# Patient Record
Sex: FEMALE | Race: BLACK OR AFRICAN AMERICAN | NOT HISPANIC OR LATINO | Employment: FULL TIME | URBAN - METROPOLITAN AREA
[De-identification: names, ages, dates, MRNs, and addresses within clinical notes are randomized per-mention and may not be internally consistent; named-entity substitution may affect disease eponyms.]

---

## 2023-01-26 PROBLEM — Z86.16 HISTORY OF COVID-19: Status: ACTIVE | Noted: 2023-01-26

## 2023-01-26 PROBLEM — N39.0 ACUTE UTI: Status: ACTIVE | Noted: 2023-01-26

## 2023-01-26 PROBLEM — J01.90 ACUTE SINUSITIS: Status: ACTIVE | Noted: 2023-01-26

## 2023-01-26 PROBLEM — R35.0 INCREASED URINARY FREQUENCY: Status: ACTIVE | Noted: 2023-01-26

## 2023-01-26 PROBLEM — M25.562 ACUTE PAIN OF LEFT KNEE: Status: ACTIVE | Noted: 2023-01-26

## 2023-01-26 PROBLEM — Q84.9: Status: ACTIVE | Noted: 2023-01-26

## 2023-01-26 PROBLEM — R51.9 HEADACHE: Status: ACTIVE | Noted: 2023-01-26

## 2023-01-26 PROBLEM — R23.2 HOT FLASHES: Status: ACTIVE | Noted: 2023-01-26

## 2023-01-26 PROBLEM — R05.9 COUGH IN ADULT: Status: ACTIVE | Noted: 2023-01-26

## 2023-01-26 PROBLEM — E66.9 CLASS 1 OBESITY WITH BODY MASS INDEX (BMI) OF 34.0 TO 34.9 IN ADULT: Status: ACTIVE | Noted: 2023-01-26

## 2023-01-26 PROBLEM — E66.811 CLASS 1 OBESITY WITH BODY MASS INDEX (BMI) OF 34.0 TO 34.9 IN ADULT: Status: ACTIVE | Noted: 2023-01-26

## 2023-01-26 PROBLEM — J30.9 ALLERGIC RHINITIS: Status: ACTIVE | Noted: 2023-01-26

## 2023-01-26 PROBLEM — R09.81 NASAL CONGESTION: Status: ACTIVE | Noted: 2023-01-26

## 2023-01-26 PROBLEM — B37.31 VAGINAL CANDIDIASIS: Status: ACTIVE | Noted: 2023-01-26

## 2023-01-26 PROBLEM — K21.9 GERD (GASTROESOPHAGEAL REFLUX DISEASE): Status: ACTIVE | Noted: 2023-01-26

## 2023-01-26 PROBLEM — R60.0 EDEMA OF LOWER EXTREMITY: Status: ACTIVE | Noted: 2023-01-26

## 2023-01-26 PROBLEM — E53.8 VITAMIN B12 DEFICIENCY: Status: ACTIVE | Noted: 2023-01-26

## 2023-01-26 PROBLEM — Z20.822 SUSPECTED COVID-19 VIRUS INFECTION: Status: ACTIVE | Noted: 2023-01-26

## 2023-01-26 PROBLEM — D62 ANEMIA DUE TO ACUTE BLOOD LOSS: Status: ACTIVE | Noted: 2023-01-26

## 2023-01-26 PROBLEM — E55.9 VITAMIN D DEFICIENCY: Status: ACTIVE | Noted: 2023-01-26

## 2023-01-26 PROBLEM — R39.9 UTI SYMPTOMS: Status: ACTIVE | Noted: 2023-01-26

## 2023-01-26 PROBLEM — R07.9 CHEST PAIN: Status: ACTIVE | Noted: 2023-01-26

## 2023-03-10 ENCOUNTER — APPOINTMENT (OUTPATIENT)
Dept: PRIMARY CARE | Facility: CLINIC | Age: 43
End: 2023-03-10
Payer: COMMERCIAL

## 2023-04-06 PROBLEM — M25.562 ACUTE PAIN OF LEFT KNEE: Status: RESOLVED | Noted: 2023-01-26 | Resolved: 2023-04-06

## 2023-04-06 PROBLEM — R39.9 UTI SYMPTOMS: Status: RESOLVED | Noted: 2023-01-26 | Resolved: 2023-04-06

## 2023-04-06 PROBLEM — B37.31 VAGINAL CANDIDIASIS: Status: RESOLVED | Noted: 2023-01-26 | Resolved: 2023-04-06

## 2023-04-06 PROBLEM — R51.9 HEADACHE: Status: RESOLVED | Noted: 2023-01-26 | Resolved: 2023-04-06

## 2023-04-06 PROBLEM — Z00.00 WELL ADULT HEALTH CHECK: Status: ACTIVE | Noted: 2023-04-06

## 2023-04-06 PROBLEM — Z86.16 HISTORY OF COVID-19: Status: RESOLVED | Noted: 2023-01-26 | Resolved: 2023-04-06

## 2023-04-06 PROBLEM — R05.9 COUGH IN ADULT: Status: RESOLVED | Noted: 2023-01-26 | Resolved: 2023-04-06

## 2023-04-06 PROBLEM — R09.81 NASAL CONGESTION: Status: RESOLVED | Noted: 2023-01-26 | Resolved: 2023-04-06

## 2023-04-06 PROBLEM — R35.0 INCREASED URINARY FREQUENCY: Status: RESOLVED | Noted: 2023-01-26 | Resolved: 2023-04-06

## 2023-04-06 PROBLEM — N39.0 ACUTE UTI: Status: RESOLVED | Noted: 2023-01-26 | Resolved: 2023-04-06

## 2023-04-06 PROBLEM — R07.9 CHEST PAIN: Status: RESOLVED | Noted: 2023-01-26 | Resolved: 2023-04-06

## 2023-04-06 PROBLEM — D62 ANEMIA DUE TO ACUTE BLOOD LOSS: Status: RESOLVED | Noted: 2023-01-26 | Resolved: 2023-04-06

## 2023-04-06 PROBLEM — J01.90 ACUTE SINUSITIS: Status: RESOLVED | Noted: 2023-01-26 | Resolved: 2023-04-06

## 2023-04-06 PROBLEM — Z20.822 SUSPECTED COVID-19 VIRUS INFECTION: Status: RESOLVED | Noted: 2023-01-26 | Resolved: 2023-04-06

## 2023-04-07 LAB
ALANINE AMINOTRANSFERASE (SGPT) (U/L) IN SER/PLAS: 14 U/L (ref 7–45)
ALBUMIN (G/DL) IN SER/PLAS: 4 G/DL (ref 3.4–5)
ALKALINE PHOSPHATASE (U/L) IN SER/PLAS: 55 U/L (ref 33–110)
ANION GAP IN SER/PLAS: 9 MMOL/L (ref 10–20)
ASPARTATE AMINOTRANSFERASE (SGOT) (U/L) IN SER/PLAS: 14 U/L (ref 9–39)
BASOPHILS (10*3/UL) IN BLOOD BY AUTOMATED COUNT: 0.02 X10E9/L (ref 0–0.1)
BASOPHILS/100 LEUKOCYTES IN BLOOD BY AUTOMATED COUNT: 0.4 % (ref 0–2)
BILIRUBIN TOTAL (MG/DL) IN SER/PLAS: 0.4 MG/DL (ref 0–1.2)
CALCIDIOL (25 OH VITAMIN D3) (NG/ML) IN SER/PLAS: 18 NG/ML
CALCIUM (MG/DL) IN SER/PLAS: 8.7 MG/DL (ref 8.6–10.3)
CARBON DIOXIDE, TOTAL (MMOL/L) IN SER/PLAS: 29 MMOL/L (ref 21–32)
CHLORIDE (MMOL/L) IN SER/PLAS: 103 MMOL/L (ref 98–107)
CHOLESTEROL (MG/DL) IN SER/PLAS: 118 MG/DL (ref 0–199)
CHOLESTEROL IN HDL (MG/DL) IN SER/PLAS: 42.1 MG/DL
CHOLESTEROL/HDL RATIO: 2.8
COBALAMIN (VITAMIN B12) (PG/ML) IN SER/PLAS: 433 PG/ML (ref 211–911)
CREATININE (MG/DL) IN SER/PLAS: 0.91 MG/DL (ref 0.5–1.05)
EOSINOPHILS (10*3/UL) IN BLOOD BY AUTOMATED COUNT: 0.08 X10E9/L (ref 0–0.7)
EOSINOPHILS/100 LEUKOCYTES IN BLOOD BY AUTOMATED COUNT: 1.7 % (ref 0–6)
ERYTHROCYTE DISTRIBUTION WIDTH (RATIO) BY AUTOMATED COUNT: 12.9 % (ref 11.5–14.5)
ERYTHROCYTE MEAN CORPUSCULAR HEMOGLOBIN CONCENTRATION (G/DL) BY AUTOMATED: 31.3 G/DL (ref 32–36)
ERYTHROCYTE MEAN CORPUSCULAR VOLUME (FL) BY AUTOMATED COUNT: 84 FL (ref 80–100)
ERYTHROCYTES (10*6/UL) IN BLOOD BY AUTOMATED COUNT: 5.25 X10E12/L (ref 4–5.2)
GFR FEMALE: 81 ML/MIN/1.73M2
GLUCOSE (MG/DL) IN SER/PLAS: 86 MG/DL (ref 74–99)
HEMATOCRIT (%) IN BLOOD BY AUTOMATED COUNT: 44.1 % (ref 36–46)
HEMOGLOBIN (G/DL) IN BLOOD: 13.8 G/DL (ref 12–16)
IMMATURE GRANULOCYTES/100 LEUKOCYTES IN BLOOD BY AUTOMATED COUNT: 0 % (ref 0–0.9)
LDL: 68 MG/DL (ref 0–99)
LEUKOCYTES (10*3/UL) IN BLOOD BY AUTOMATED COUNT: 4.8 X10E9/L (ref 4.4–11.3)
LYMPHOCYTES (10*3/UL) IN BLOOD BY AUTOMATED COUNT: 1.47 X10E9/L (ref 1.2–4.8)
LYMPHOCYTES/100 LEUKOCYTES IN BLOOD BY AUTOMATED COUNT: 30.9 % (ref 13–44)
MONOCYTES (10*3/UL) IN BLOOD BY AUTOMATED COUNT: 0.36 X10E9/L (ref 0.1–1)
MONOCYTES/100 LEUKOCYTES IN BLOOD BY AUTOMATED COUNT: 7.6 % (ref 2–10)
NEUTROPHILS (10*3/UL) IN BLOOD BY AUTOMATED COUNT: 2.83 X10E9/L (ref 1.2–7.7)
NEUTROPHILS/100 LEUKOCYTES IN BLOOD BY AUTOMATED COUNT: 59.4 % (ref 40–80)
NRBC (PER 100 WBCS) BY AUTOMATED COUNT: 0 /100 WBC (ref 0–0)
PLATELETS (10*3/UL) IN BLOOD AUTOMATED COUNT: 201 X10E9/L (ref 150–450)
POTASSIUM (MMOL/L) IN SER/PLAS: 4 MMOL/L (ref 3.5–5.3)
PROTEIN TOTAL: 7.3 G/DL (ref 6.4–8.2)
SODIUM (MMOL/L) IN SER/PLAS: 137 MMOL/L (ref 136–145)
TRIGLYCERIDE (MG/DL) IN SER/PLAS: 41 MG/DL (ref 0–149)
UREA NITROGEN (MG/DL) IN SER/PLAS: 14 MG/DL (ref 6–23)
VLDL: 8 MG/DL (ref 0–40)

## 2023-04-10 ENCOUNTER — TELEPHONE (OUTPATIENT)
Dept: PRIMARY CARE | Facility: CLINIC | Age: 43
End: 2023-04-10
Payer: COMMERCIAL

## 2023-04-10 NOTE — TELEPHONE ENCOUNTER
----- Message from Bibi Phillips MD sent at 4/10/2023  9:37 AM EDT -----  Regarding: FW:labs  Please let her know her labs are OK except for her vitamin D. It is too low and she needs to start on at least 2000 IU per day. Thank you.  ----- Message -----  From: [facility] - Lab Results In  Sent: 4/7/2023   9:37 PM EDT  To: Bibi Phillips MD

## 2023-04-11 ENCOUNTER — OFFICE VISIT (OUTPATIENT)
Dept: PRIMARY CARE | Facility: CLINIC | Age: 43
End: 2023-04-11
Payer: COMMERCIAL

## 2023-04-11 VITALS
TEMPERATURE: 98.1 F | OXYGEN SATURATION: 98 % | WEIGHT: 241 LBS | DIASTOLIC BLOOD PRESSURE: 92 MMHG | RESPIRATION RATE: 18 BRPM | HEART RATE: 68 BPM | HEIGHT: 72 IN | SYSTOLIC BLOOD PRESSURE: 138 MMHG | BODY MASS INDEX: 32.64 KG/M2

## 2023-04-11 DIAGNOSIS — E55.9 VITAMIN D DEFICIENCY: ICD-10-CM

## 2023-04-11 DIAGNOSIS — E66.09 CLASS 1 OBESITY DUE TO EXCESS CALORIES WITHOUT SERIOUS COMORBIDITY WITH BODY MASS INDEX (BMI) OF 34.0 TO 34.9 IN ADULT: ICD-10-CM

## 2023-04-11 DIAGNOSIS — R41.89 COGNITIVE CHANGE: ICD-10-CM

## 2023-04-11 DIAGNOSIS — N95.2 ATROPHIC VAGINITIS: ICD-10-CM

## 2023-04-11 DIAGNOSIS — Z00.00 WELL ADULT HEALTH CHECK: Primary | ICD-10-CM

## 2023-04-11 PROCEDURE — 99214 OFFICE O/P EST MOD 30 MIN: CPT | Performed by: FAMILY MEDICINE

## 2023-04-11 PROCEDURE — 3008F BODY MASS INDEX DOCD: CPT | Performed by: FAMILY MEDICINE

## 2023-04-11 PROCEDURE — 1036F TOBACCO NON-USER: CPT | Performed by: FAMILY MEDICINE

## 2023-04-11 RX ORDER — ESTRADIOL 0.1 MG/G
CREAM VAGINAL
Qty: 42.5 G | Refills: 5 | Status: SHIPPED | OUTPATIENT
Start: 2023-04-11 | End: 2023-04-13 | Stop reason: SDUPTHER

## 2023-04-11 NOTE — PROGRESS NOTES
"Subjective   Vance Gage is a 42 y.o. female who presents for Follow-up (6 month follow up for labs ).    Doing well.      Notices lately she has word finding problems.  No other cognitive problems.    Sleep is good .  No snoring.    Had a hysterectomy in 2019 for fibroids.  Notices vaginal dryness with sex.           Review of Systems   Constitutional:  Negative for fever.   Respiratory:  Negative for shortness of breath.    Cardiovascular:  Negative for chest pain.   Gastrointestinal:  Negative for nausea and vomiting.   Neurological:  Negative for dizziness and light-headedness.   All other systems reviewed and are negative.      Objective   BP (!) 138/92   Pulse 68   Temp 36.7 °C (98.1 °F)   Resp 18   Ht 1.816 m (5' 11.5\")   Wt 109 kg (241 lb)   SpO2 98%   BMI 33.14 kg/m²     Physical Exam  HENT:      Head: Normocephalic and atraumatic.      Mouth/Throat:      Mouth: Mucous membranes are moist.      Pharynx: Oropharynx is clear.   Eyes:      Extraocular Movements: Extraocular movements intact.      Pupils: Pupils are equal, round, and reactive to light.   Cardiovascular:      Rate and Rhythm: Normal rate and regular rhythm.      Heart sounds: Normal heart sounds.   Pulmonary:      Effort: Pulmonary effort is normal.      Breath sounds: Normal breath sounds.   Musculoskeletal:         General: Normal range of motion.      Cervical back: Normal range of motion.   Lymphadenopathy:      Cervical: No cervical adenopathy.   Skin:     General: Skin is warm and dry.   Neurological:      General: No focal deficit present.      Mental Status: She is alert.   Psychiatric:         Mood and Affect: Mood normal.         Assessment/Plan   Problem List Items Addressed This Visit          Genitourinary    Atrophic vaginitis    Relevant Medications    estradiol (Estrace) 0.01 % (0.1 mg/gram) vaginal cream       Endocrine/Metabolic    Class 1 obesity with body mass index (BMI) of 34.0 to 34.9 in adult    Vitamin D " deficiency     Start Vitamin D3 5000 international units  daily              Other    Well adult health check - Primary    Cognitive change     Do mind stimulating activities.    Sleep 8 hours per night.    Monitor.  Notify me if things are getting worse.                There are no Patient Instructions on file for this visit.

## 2023-04-12 ENCOUNTER — TELEPHONE (OUTPATIENT)
Dept: PRIMARY CARE | Facility: CLINIC | Age: 43
End: 2023-04-12
Payer: COMMERCIAL

## 2023-04-12 NOTE — TELEPHONE ENCOUNTER
Pharmacy called because they need to know how much estradiol cream she is applying to her vagina. whether it be a pea size or 1 GM. Please advise.

## 2023-04-13 DIAGNOSIS — N95.2 ATROPHIC VAGINITIS: ICD-10-CM

## 2023-04-13 PROBLEM — R41.89 COGNITIVE CHANGE: Status: ACTIVE | Noted: 2023-04-13

## 2023-04-13 RX ORDER — ESTRADIOL 0.1 MG/G
CREAM VAGINAL
Qty: 42.5 G | Refills: 5 | Status: SHIPPED | OUTPATIENT
Start: 2023-04-13 | End: 2023-10-23 | Stop reason: ALTCHOICE

## 2023-04-13 ASSESSMENT — ENCOUNTER SYMPTOMS
FEVER: 0
DIZZINESS: 0
LIGHT-HEADEDNESS: 0
SHORTNESS OF BREATH: 0
VOMITING: 0
NAUSEA: 0

## 2023-04-13 NOTE — ASSESSMENT & PLAN NOTE
Do mind stimulating activities.    Sleep 8 hours per night.    Monitor.  Notify me if things are getting worse.

## 2023-08-15 ENCOUNTER — OFFICE VISIT (OUTPATIENT)
Dept: PRIMARY CARE | Facility: CLINIC | Age: 43
End: 2023-08-15
Payer: COMMERCIAL

## 2023-08-15 VITALS
WEIGHT: 240 LBS | HEART RATE: 76 BPM | RESPIRATION RATE: 18 BRPM | DIASTOLIC BLOOD PRESSURE: 82 MMHG | SYSTOLIC BLOOD PRESSURE: 124 MMHG | BODY MASS INDEX: 33.01 KG/M2 | TEMPERATURE: 98.1 F | OXYGEN SATURATION: 98 %

## 2023-08-15 DIAGNOSIS — R35.0 URINARY FREQUENCY: Primary | ICD-10-CM

## 2023-08-15 DIAGNOSIS — L98.9 SORE ON TOE: ICD-10-CM

## 2023-08-15 PROBLEM — L97.509 SORE ON TOE: Status: ACTIVE | Noted: 2023-08-15

## 2023-08-15 LAB
POC APPEARANCE, URINE: CLEAR
POC BILIRUBIN, URINE: NEGATIVE
POC BLOOD, URINE: ABNORMAL
POC COLOR, URINE: YELLOW
POC GLUCOSE, URINE: NEGATIVE MG/DL
POC KETONES, URINE: NEGATIVE MG/DL
POC LEUKOCYTES, URINE: ABNORMAL
POC NITRITE,URINE: NEGATIVE
POC PH, URINE: 6.5 PH
POC PROTEIN, URINE: ABNORMAL MG/DL
POC SPECIFIC GRAVITY, URINE: 1.02
POC UROBILINOGEN, URINE: 0.2 EU/DL

## 2023-08-15 PROCEDURE — 81002 URINALYSIS NONAUTO W/O SCOPE: CPT | Performed by: NURSE PRACTITIONER

## 2023-08-15 PROCEDURE — 87086 URINE CULTURE/COLONY COUNT: CPT

## 2023-08-15 PROCEDURE — 3008F BODY MASS INDEX DOCD: CPT | Performed by: NURSE PRACTITIONER

## 2023-08-15 PROCEDURE — 1036F TOBACCO NON-USER: CPT | Performed by: NURSE PRACTITIONER

## 2023-08-15 PROCEDURE — 99213 OFFICE O/P EST LOW 20 MIN: CPT | Performed by: NURSE PRACTITIONER

## 2023-08-15 RX ORDER — NITROFURANTOIN 25; 75 MG/1; MG/1
100 CAPSULE ORAL 2 TIMES DAILY
Qty: 10 CAPSULE | Refills: 0 | Status: SHIPPED | OUTPATIENT
Start: 2023-08-15 | End: 2023-08-20

## 2023-08-15 ASSESSMENT — ENCOUNTER SYMPTOMS
SLEEP DISTURBANCE: 0
CHILLS: 0
APPETITE CHANGE: 0
FATIGUE: 0
HEADACHES: 0
FLANK PAIN: 0
COUGH: 0
DIARRHEA: 0
NAUSEA: 0
SHORTNESS OF BREATH: 0
VOMITING: 0
CONSTIPATION: 0
FREQUENCY: 1
DYSURIA: 0
ACTIVITY CHANGE: 0
FEVER: 0

## 2023-08-15 NOTE — PROGRESS NOTES
Subjective   Patient ID: Vance Gage is a 42 y.o. female who presents for UTI and Toe Pain.    UTI- Yesterday   noticed pelvic pressure  Increased hydration  Today having urgency/frequency  No fever or chills  No back pain  Mild diarrhea this morning    L small toe- last Thursday  Stubbed it going into house  Toe is still swollen and painful  Was bruised over the last couple days  Has not tried anything for pain          UTI   This is a new problem. The current episode started yesterday. The problem occurs every urination. The problem has been unchanged. Associated symptoms include frequency and urgency. Pertinent negatives include no chills, flank pain, nausea or vomiting. Associated symptoms comments: Abdominal pain. She has tried nothing for the symptoms. Her past medical history is significant for recurrent UTIs.   Toe Pain   The incident occurred more than 1 week ago. The incident occurred at home. The injury mechanism was a direct blow. The pain is present in the left foot (5 digit). She reports no foreign bodies present. The symptoms are aggravated by movement. She has tried nothing for the symptoms.        Review of Systems   Constitutional:  Negative for activity change, appetite change, chills, fatigue and fever.   HENT:  Negative for congestion.    Respiratory:  Negative for cough and shortness of breath.    Gastrointestinal:  Negative for constipation, diarrhea, nausea and vomiting.   Genitourinary:  Positive for frequency, pelvic pain and urgency. Negative for dysuria, flank pain, vaginal discharge and vaginal pain.   Neurological:  Negative for headaches.   Psychiatric/Behavioral:  Negative for sleep disturbance.        Objective   There were no vitals taken for this visit.    Physical Exam  Vitals reviewed.   Constitutional:       Appearance: Normal appearance.   HENT:      Head: Normocephalic.   Eyes:      Extraocular Movements: Extraocular movements intact.      Pupils: Pupils are equal, round,  and reactive to light.   Cardiovascular:      Rate and Rhythm: Normal rate and regular rhythm.      Pulses: Normal pulses.      Heart sounds: Normal heart sounds.   Pulmonary:      Effort: Pulmonary effort is normal.      Breath sounds: Normal breath sounds.   Abdominal:      General: Abdomen is flat. Bowel sounds are normal.      Palpations: Abdomen is soft.   Musculoskeletal:      Cervical back: Normal range of motion.      Right foot: Normal.      Left foot: Normal range of motion. Swelling and bony tenderness present. No laceration. Normal pulse.   Skin:     General: Skin is warm and dry.      Capillary Refill: Capillary refill takes less than 2 seconds.   Neurological:      General: No focal deficit present.      Mental Status: She is alert and oriented to person, place, and time.   Psychiatric:         Mood and Affect: Mood normal.         Behavior: Behavior normal.         Assessment/Plan   Problem List Items Addressed This Visit       Urinary frequency - Primary     IO UA indicates UTI; Will treat with antibiotics  Take full course until completed  Urine culture to be sent; Will follow up as needed  Hydration encouraged  Follow up with PCP if not improving  ER for any fevers, back pain or worsening of symptoms           Relevant Medications    nitrofurantoin, macrocrystal-monohydrate, (Macrobid) 100 mg capsule    Other Relevant Orders    POCT UA (nonautomated) manually resulted (Completed)    Urine Culture    Sore on toe     Order for xray placed; Will follow up on results  Can take up to 600 mg of ibuprofen for the pain  Encouraged heat/ice for 20 minutes several times a day  Wear supportive shoes while healing  Referral made to ortho if needed  Follow up with PCP as needed           Relevant Orders    Referral to Orthopaedic Surgery    XR foot left 3+ views

## 2023-08-15 NOTE — ASSESSMENT & PLAN NOTE
IO UA indicates UTI; Will treat with antibiotics  Take full course until completed  Urine culture to be sent; Will follow up as needed  Hydration encouraged  Follow up with PCP if not improving  ER for any fevers, back pain or worsening of symptoms

## 2023-08-15 NOTE — ASSESSMENT & PLAN NOTE
Order for xray placed; Will follow up on results  Can take up to 600 mg of ibuprofen for the pain  Encouraged heat/ice for 20 minutes several times a day  Wear supportive shoes while healing  Referral made to ortho if needed  Follow up with PCP as needed

## 2023-08-16 LAB — URINE CULTURE: NORMAL

## 2023-09-27 ENCOUNTER — OFFICE VISIT (OUTPATIENT)
Dept: PRIMARY CARE | Facility: CLINIC | Age: 43
End: 2023-09-27
Payer: COMMERCIAL

## 2023-09-27 VITALS
WEIGHT: 244 LBS | TEMPERATURE: 98.7 F | BODY MASS INDEX: 33.56 KG/M2 | RESPIRATION RATE: 17 BRPM | SYSTOLIC BLOOD PRESSURE: 142 MMHG | DIASTOLIC BLOOD PRESSURE: 90 MMHG | HEART RATE: 76 BPM | OXYGEN SATURATION: 99 %

## 2023-09-27 DIAGNOSIS — R42 DIZZINESS: Primary | ICD-10-CM

## 2023-09-27 DIAGNOSIS — I10 HYPERTENSION, UNSPECIFIED TYPE: ICD-10-CM

## 2023-09-27 PROBLEM — R51.9 HEADACHE: Status: RESOLVED | Noted: 2023-09-27 | Resolved: 2023-09-27

## 2023-09-27 PROBLEM — R07.9 CHEST PAIN: Status: RESOLVED | Noted: 2023-09-27 | Resolved: 2023-09-27

## 2023-09-27 PROBLEM — S90.122A CONTUSION OF LESSER TOE OF LEFT FOOT WITHOUT DAMAGE TO NAIL: Status: RESOLVED | Noted: 2023-09-27 | Resolved: 2023-09-27

## 2023-09-27 PROBLEM — R05.9 COUGH IN ADULT: Status: RESOLVED | Noted: 2023-09-27 | Resolved: 2023-09-27

## 2023-09-27 PROBLEM — J01.90 ACUTE SINUSITIS: Status: RESOLVED | Noted: 2023-09-27 | Resolved: 2023-09-27

## 2023-09-27 PROBLEM — R39.9 UTI SYMPTOMS: Status: RESOLVED | Noted: 2023-01-26 | Resolved: 2023-09-27

## 2023-09-27 PROBLEM — Z86.16 HISTORY OF COVID-19: Status: RESOLVED | Noted: 2023-01-26 | Resolved: 2023-09-27

## 2023-09-27 PROBLEM — R09.81 NASAL CONGESTION: Status: RESOLVED | Noted: 2023-01-26 | Resolved: 2023-09-27

## 2023-09-27 PROBLEM — N39.0 ACUTE UTI: Status: RESOLVED | Noted: 2023-09-27 | Resolved: 2023-09-27

## 2023-09-27 PROBLEM — D62 ANEMIA DUE TO ACUTE BLOOD LOSS: Status: RESOLVED | Noted: 2023-09-27 | Resolved: 2023-09-27

## 2023-09-27 PROBLEM — M25.562 ACUTE PAIN OF LEFT KNEE: Status: RESOLVED | Noted: 2023-09-27 | Resolved: 2023-09-27

## 2023-09-27 PROBLEM — Z20.822 SUSPECTED COVID-19 VIRUS INFECTION: Status: RESOLVED | Noted: 2023-01-26 | Resolved: 2023-09-27

## 2023-09-27 PROCEDURE — 3008F BODY MASS INDEX DOCD: CPT

## 2023-09-27 PROCEDURE — 3080F DIAST BP >= 90 MM HG: CPT

## 2023-09-27 PROCEDURE — 1036F TOBACCO NON-USER: CPT

## 2023-09-27 PROCEDURE — 3077F SYST BP >= 140 MM HG: CPT

## 2023-09-27 PROCEDURE — 99213 OFFICE O/P EST LOW 20 MIN: CPT

## 2023-09-27 RX ORDER — LISINOPRIL 10 MG/1
10 TABLET ORAL DAILY
Qty: 30 TABLET | Refills: 11 | Status: SHIPPED | OUTPATIENT
Start: 2023-09-27 | End: 2023-10-23 | Stop reason: SDUPTHER

## 2023-09-27 RX ORDER — MECLIZINE HCL 12.5 MG 12.5 MG/1
12.5 TABLET ORAL 3 TIMES DAILY PRN
Qty: 30 TABLET | Refills: 0 | Status: SHIPPED | OUTPATIENT
Start: 2023-09-27 | End: 2024-02-05 | Stop reason: ALTCHOICE

## 2023-09-27 ASSESSMENT — ENCOUNTER SYMPTOMS
PALPITATIONS: 0
APNEA: 0
SINUS PAIN: 0
NECK PAIN: 0
PHOTOPHOBIA: 0
RHINORRHEA: 0
CHILLS: 0
CHEST TIGHTNESS: 0
CONFUSION: 0
SINUS PRESSURE: 0
HEADACHES: 0
WEAKNESS: 0
DIZZINESS: 0
NUMBNESS: 0
FATIGUE: 0
FEVER: 0
LIGHT-HEADEDNESS: 0

## 2023-09-27 NOTE — PROGRESS NOTES
Subjective   Patient ID: Vance Gage is a 42 y.o. female who presents for Dizziness.  HPI    Review of Systems   Constitutional:  Negative for chills, fatigue and fever.   HENT:  Negative for congestion, ear discharge, ear pain, postnasal drip, rhinorrhea, sinus pressure, sinus pain and tinnitus.    Eyes:  Negative for photophobia and visual disturbance.   Respiratory:  Negative for apnea and chest tightness.    Cardiovascular:  Negative for chest pain, palpitations and leg swelling.   Musculoskeletal:  Negative for neck pain.   Neurological:  Negative for dizziness, syncope, weakness, light-headedness, numbness and headaches.   Psychiatric/Behavioral:  Negative for confusion.        Objective   Physical Exam  Vitals reviewed.   Constitutional:       General: She is not in acute distress.     Appearance: Normal appearance. She is normal weight. She is not ill-appearing or toxic-appearing.   HENT:      Head: Normocephalic and atraumatic.      Right Ear: Tympanic membrane, ear canal and external ear normal.      Left Ear: Tympanic membrane, ear canal and external ear normal.      Nose: Nose normal.      Mouth/Throat:      Mouth: Mucous membranes are moist.      Pharynx: Oropharynx is clear.   Eyes:      General: No scleral icterus.     Extraocular Movements: Extraocular movements intact.      Conjunctiva/sclera: Conjunctivae normal.      Pupils: Pupils are equal, round, and reactive to light.   Cardiovascular:      Rate and Rhythm: Normal rate and regular rhythm.      Pulses: Normal pulses.      Heart sounds: Normal heart sounds. No murmur heard.  Pulmonary:      Effort: Pulmonary effort is normal.      Breath sounds: Normal breath sounds. No wheezing, rhonchi or rales.   Musculoskeletal:         General: Normal range of motion.      Right lower leg: No edema.      Left lower leg: No edema.   Skin:     General: Skin is warm and dry.      Capillary Refill: Capillary refill takes less than 2 seconds.   Neurological:       Mental Status: She is alert and oriented to person, place, and time.      Sensory: Sensation is intact.      Motor: Motor function is intact.      Coordination: Coordination is intact.      Gait: Gait is intact.      Deep Tendon Reflexes:      Reflex Scores:       Patellar reflexes are 2+ on the right side and 2+ on the left side.       Achilles reflexes are 2+ on the right side and 2+ on the left side.  Psychiatric:         Mood and Affect: Mood normal.         Behavior: Behavior normal.         Thought Content: Thought content normal.         Judgment: Judgment normal.         Assessment/Plan   Problem List Items Addressed This Visit    None  Visit Diagnoses         Codes    Dizziness    -  Primary R42    Relevant Medications    meclizine (Antivert) 12.5 mg tablet    Hypertension, unspecified type     I10    Relevant Medications    lisinopril 10 mg tablet        Reviewed labs, ECG, and imaging from ED visit yesterday at [facility].    Her blood pressure was in the 190s at the hospital visit.  Has since been in the 120s to 140s at home with no symptoms since prior to the hospital visit.  Differential is that this is BPPV versus hypertension with CNS involvement.  We will have her check her blood pressure twice daily and started lisinopril which she will start if her blood pressure has multiple readings over systolic 135.  For possible BPV gave home handout for Epley maneuver and Antivert.  Gave hospital return precautions.  Patient to follow-up with myself or Dr. Phillips in 2 to 3 weeks to assess her blood pressure and dizziness.

## 2023-09-28 NOTE — PROGRESS NOTES
I reviewed with the resident the medical history and the resident’s findings on physical examination.  I discussed with the resident the patient’s diagnosis and concur with the treatment plan as documented in the resident note.     Kennedy Guillen, DO

## 2023-10-11 ENCOUNTER — APPOINTMENT (OUTPATIENT)
Dept: PRIMARY CARE | Facility: CLINIC | Age: 43
End: 2023-10-11
Payer: COMMERCIAL

## 2023-10-23 ENCOUNTER — OFFICE VISIT (OUTPATIENT)
Dept: PRIMARY CARE | Facility: CLINIC | Age: 43
End: 2023-10-23
Payer: COMMERCIAL

## 2023-10-23 VITALS
BODY MASS INDEX: 32.37 KG/M2 | TEMPERATURE: 98.3 F | DIASTOLIC BLOOD PRESSURE: 100 MMHG | HEART RATE: 60 BPM | SYSTOLIC BLOOD PRESSURE: 143 MMHG | HEIGHT: 72 IN | WEIGHT: 239 LBS

## 2023-10-23 DIAGNOSIS — Z12.31 ENCOUNTER FOR SCREENING MAMMOGRAM FOR MALIGNANT NEOPLASM OF BREAST: Primary | ICD-10-CM

## 2023-10-23 DIAGNOSIS — E55.9 VITAMIN D DEFICIENCY: ICD-10-CM

## 2023-10-23 DIAGNOSIS — Z00.00 WELL ADULT HEALTH CHECK: ICD-10-CM

## 2023-10-23 DIAGNOSIS — I10 HYPERTENSION, UNSPECIFIED TYPE: ICD-10-CM

## 2023-10-23 DIAGNOSIS — I10 PRIMARY HYPERTENSION: ICD-10-CM

## 2023-10-23 PROBLEM — L98.9 SORE ON TOE: Status: RESOLVED | Noted: 2023-08-15 | Resolved: 2023-10-23

## 2023-10-23 PROBLEM — R35.0 INCREASED URINARY FREQUENCY: Status: RESOLVED | Noted: 2023-01-26 | Resolved: 2023-10-23

## 2023-10-23 PROBLEM — R41.89 COGNITIVE CHANGE: Status: RESOLVED | Noted: 2023-04-13 | Resolved: 2023-10-23

## 2023-10-23 PROBLEM — L97.509 SORE ON TOE: Status: RESOLVED | Noted: 2023-08-15 | Resolved: 2023-10-23

## 2023-10-23 PROCEDURE — 1036F TOBACCO NON-USER: CPT | Performed by: FAMILY MEDICINE

## 2023-10-23 PROCEDURE — 3080F DIAST BP >= 90 MM HG: CPT | Performed by: FAMILY MEDICINE

## 2023-10-23 PROCEDURE — 3008F BODY MASS INDEX DOCD: CPT | Performed by: FAMILY MEDICINE

## 2023-10-23 PROCEDURE — 3077F SYST BP >= 140 MM HG: CPT | Performed by: FAMILY MEDICINE

## 2023-10-23 PROCEDURE — 99396 PREV VISIT EST AGE 40-64: CPT | Performed by: FAMILY MEDICINE

## 2023-10-23 RX ORDER — LISINOPRIL 20 MG/1
20 TABLET ORAL DAILY
Qty: 30 TABLET | Refills: 11 | Status: SHIPPED | OUTPATIENT
Start: 2023-10-23 | End: 2024-02-05 | Stop reason: SINTOL

## 2023-10-23 ASSESSMENT — PATIENT HEALTH QUESTIONNAIRE - PHQ9
1. LITTLE INTEREST OR PLEASURE IN DOING THINGS: NOT AT ALL
SUM OF ALL RESPONSES TO PHQ9 QUESTIONS 1 AND 2: 0
2. FEELING DOWN, DEPRESSED OR HOPELESS: NOT AT ALL

## 2023-10-23 ASSESSMENT — ENCOUNTER SYMPTOMS
LIGHT-HEADEDNESS: 0
VOMITING: 0
NAUSEA: 0
FEVER: 0
PALPITATIONS: 0
DIZZINESS: 0
SHORTNESS OF BREATH: 0

## 2023-10-23 NOTE — PROGRESS NOTES
"Subjective   Vance Gage is a 42 y.o. female who presents for Annual Exam.    Was in the Er with very high blood pressure last month.  Started on lisinopril.   130-160/.  Lower in the AM.  Gets higher throughout.    No particular stress.  Not much exercise.   No stimulants.    Doesn't feel like the lisinopril is adequate.  Tolerating it fine             Review of Systems   Constitutional:  Negative for fever.   Respiratory:  Negative for shortness of breath.    Cardiovascular:  Negative for chest pain and palpitations.   Gastrointestinal:  Negative for nausea and vomiting.   Neurological:  Negative for dizziness and light-headedness.   All other systems reviewed and are negative.      Objective   BP (!) 143/100   Pulse 60   Temp 36.8 °C (98.3 °F)   Ht 1.816 m (5' 11.5\")   Wt 108 kg (239 lb)   BMI 32.87 kg/m²     Physical Exam  HENT:      Head: Normocephalic and atraumatic.      Mouth/Throat:      Mouth: Mucous membranes are moist.      Pharynx: Oropharynx is clear.   Eyes:      Extraocular Movements: Extraocular movements intact.      Pupils: Pupils are equal, round, and reactive to light.   Cardiovascular:      Rate and Rhythm: Normal rate and regular rhythm.      Heart sounds: Normal heart sounds.   Pulmonary:      Effort: Pulmonary effort is normal.      Breath sounds: Normal breath sounds.   Musculoskeletal:         General: Normal range of motion.      Cervical back: Normal range of motion.   Lymphadenopathy:      Cervical: No cervical adenopathy.   Skin:     General: Skin is warm and dry.   Neurological:      General: No focal deficit present.      Mental Status: She is alert.   Psychiatric:         Mood and Affect: Mood normal.         Assessment/Plan   Problem List Items Addressed This Visit       Vitamin D deficiency    Relevant Orders    Vitamin D 25-Hydroxy,Total (for eval of Vitamin D levels)    Well adult health check    Relevant Orders    Comprehensive Metabolic Panel    Lipid Panel    " Primary hypertension     Monitor your blood pressure at home at least once a week and record.  Please bring results to your next visit.  Take your medicine as prescribed.    Exercise at least 30 minutes daily.  Lose weight.   Avoid eating processed foods, canned foods, etc.    Limit adding salt at the table.             Relevant Orders    TSH with reflex to Free T4 if abnormal    Vitamin B12    Follow Up In [facility] - PCP - Established     Other Visit Diagnoses       Encounter for screening mammogram for malignant neoplasm of breast    -  Primary    Relevant Orders    [facility] mammo bilateral screening tomosynthesis    Hypertension, unspecified type        Relevant Medications    lisinopril 20 mg tablet              Patient Instructions   Declines flu and covid vaccine.

## 2023-10-31 ENCOUNTER — APPOINTMENT (OUTPATIENT)
Dept: RADIOLOGY | Facility: CLINIC | Age: 43
End: 2023-10-31
Payer: COMMERCIAL

## 2023-11-10 ENCOUNTER — ANCILLARY PROCEDURE (OUTPATIENT)
Dept: RADIOLOGY | Facility: CLINIC | Age: 43
End: 2023-11-10
Payer: COMMERCIAL

## 2023-11-10 DIAGNOSIS — Z12.31 ENCOUNTER FOR SCREENING MAMMOGRAM FOR MALIGNANT NEOPLASM OF BREAST: ICD-10-CM

## 2023-11-10 PROCEDURE — 77067 SCR MAMMO BI INCL CAD: CPT | Performed by: RADIOLOGY

## 2023-11-10 PROCEDURE — 77067 SCR MAMMO BI INCL CAD: CPT

## 2023-11-10 PROCEDURE — 77063 BREAST TOMOSYNTHESIS BI: CPT | Performed by: RADIOLOGY

## 2023-11-28 ENCOUNTER — LAB (OUTPATIENT)
Dept: LAB | Facility: LAB | Age: 43
End: 2023-11-28
Payer: COMMERCIAL

## 2023-11-28 DIAGNOSIS — E55.9 VITAMIN D DEFICIENCY: ICD-10-CM

## 2023-11-28 DIAGNOSIS — Z00.00 WELL ADULT HEALTH CHECK: ICD-10-CM

## 2023-11-28 DIAGNOSIS — I10 PRIMARY HYPERTENSION: ICD-10-CM

## 2023-11-28 LAB
25(OH)D3 SERPL-MCNC: 21 NG/ML (ref 30–100)
ALBUMIN SERPL BCP-MCNC: 3.8 G/DL (ref 3.4–5)
ALP SERPL-CCNC: 48 U/L (ref 33–110)
ALT SERPL W P-5'-P-CCNC: 14 U/L (ref 7–45)
ANION GAP SERPL CALC-SCNC: 11 MMOL/L (ref 10–20)
AST SERPL W P-5'-P-CCNC: 15 U/L (ref 9–39)
BILIRUB SERPL-MCNC: 0.4 MG/DL (ref 0–1.2)
BUN SERPL-MCNC: 14 MG/DL (ref 6–23)
CALCIUM SERPL-MCNC: 8.8 MG/DL (ref 8.6–10.3)
CHLORIDE SERPL-SCNC: 102 MMOL/L (ref 98–107)
CHOLEST SERPL-MCNC: 138 MG/DL (ref 0–199)
CHOLESTEROL/HDL RATIO: 3.1
CO2 SERPL-SCNC: 28 MMOL/L (ref 21–32)
CREAT SERPL-MCNC: 1.03 MG/DL (ref 0.5–1.05)
GFR SERPL CREATININE-BSD FRML MDRD: 70 ML/MIN/1.73M*2
GLUCOSE SERPL-MCNC: 82 MG/DL (ref 74–99)
HDLC SERPL-MCNC: 43.9 MG/DL
LDLC SERPL CALC-MCNC: 83 MG/DL
NON HDL CHOLESTEROL: 94 MG/DL (ref 0–149)
POTASSIUM SERPL-SCNC: 4 MMOL/L (ref 3.5–5.3)
PROT SERPL-MCNC: 7 G/DL (ref 6.4–8.2)
SODIUM SERPL-SCNC: 137 MMOL/L (ref 136–145)
TRIGL SERPL-MCNC: 54 MG/DL (ref 0–149)
TSH SERPL-ACNC: 0.51 MIU/L (ref 0.44–3.98)
VIT B12 SERPL-MCNC: 335 PG/ML (ref 211–911)
VLDL: 11 MG/DL (ref 0–40)

## 2023-11-28 PROCEDURE — 82306 VITAMIN D 25 HYDROXY: CPT

## 2023-11-28 PROCEDURE — 36415 COLL VENOUS BLD VENIPUNCTURE: CPT

## 2023-11-28 PROCEDURE — 80061 LIPID PANEL: CPT

## 2023-11-28 PROCEDURE — 80053 COMPREHEN METABOLIC PANEL: CPT

## 2023-11-28 PROCEDURE — 82607 VITAMIN B-12: CPT

## 2023-11-28 PROCEDURE — 84443 ASSAY THYROID STIM HORMONE: CPT

## 2024-01-23 ENCOUNTER — APPOINTMENT (OUTPATIENT)
Dept: PRIMARY CARE | Facility: CLINIC | Age: 44
End: 2024-01-23
Payer: COMMERCIAL

## 2024-01-30 ENCOUNTER — OFFICE VISIT (OUTPATIENT)
Dept: PRIMARY CARE | Facility: CLINIC | Age: 44
End: 2024-01-30
Payer: COMMERCIAL

## 2024-01-30 VITALS
HEART RATE: 69 BPM | DIASTOLIC BLOOD PRESSURE: 92 MMHG | SYSTOLIC BLOOD PRESSURE: 139 MMHG | WEIGHT: 235.38 LBS | BODY MASS INDEX: 31.88 KG/M2 | HEIGHT: 72 IN | TEMPERATURE: 98.3 F

## 2024-01-30 DIAGNOSIS — I10 PRIMARY HYPERTENSION: ICD-10-CM

## 2024-01-30 PROCEDURE — 99214 OFFICE O/P EST MOD 30 MIN: CPT | Performed by: FAMILY MEDICINE

## 2024-01-30 PROCEDURE — 1036F TOBACCO NON-USER: CPT | Performed by: FAMILY MEDICINE

## 2024-01-30 PROCEDURE — 3080F DIAST BP >= 90 MM HG: CPT | Performed by: FAMILY MEDICINE

## 2024-01-30 PROCEDURE — 3075F SYST BP GE 130 - 139MM HG: CPT | Performed by: FAMILY MEDICINE

## 2024-01-30 PROCEDURE — 3008F BODY MASS INDEX DOCD: CPT | Performed by: FAMILY MEDICINE

## 2024-01-30 RX ORDER — LOSARTAN POTASSIUM 50 MG/1
50 TABLET ORAL DAILY
Qty: 90 TABLET | Refills: 1 | Status: SHIPPED | OUTPATIENT
Start: 2024-01-30 | End: 2025-01-29

## 2024-01-30 ASSESSMENT — ENCOUNTER SYMPTOMS: HYPERTENSION: 1

## 2024-01-30 NOTE — PROGRESS NOTES
"Subjective   Vance Gage is a 43 y.o. female who presents for Hypertension (3mon check).    BP at home has been much better.  118/82.  Started to have a cough with the lisinopril so she started taking it every other day.    She didn't take it today.    BP is higher the days she doesn't take it.      Sometimes has trouble with word finding      Hypertension  Pertinent negatives include no chest pain or shortness of breath.        Review of Systems   Constitutional:  Negative for fever.   Respiratory:  Positive for cough. Negative for shortness of breath.    Cardiovascular:  Negative for chest pain.   Gastrointestinal:  Negative for nausea and vomiting.   Neurological:  Negative for dizziness and light-headedness.   All other systems reviewed and are negative.      Objective   BP (!) 139/92   Pulse 69   Temp 36.8 °C (98.3 °F)   Ht 1.816 m (5' 11.5\")   Wt 107 kg (235 lb 6 oz)   LMP  (LMP Unknown)   BMI 32.37 kg/m²     Physical Exam  HENT:      Head: Normocephalic and atraumatic.      Mouth/Throat:      Mouth: Mucous membranes are moist.      Pharynx: Oropharynx is clear.   Eyes:      Extraocular Movements: Extraocular movements intact.      Pupils: Pupils are equal, round, and reactive to light.   Cardiovascular:      Rate and Rhythm: Normal rate and regular rhythm.      Heart sounds: Normal heart sounds.   Pulmonary:      Effort: Pulmonary effort is normal.      Breath sounds: Normal breath sounds.   Musculoskeletal:         General: Normal range of motion.      Cervical back: Normal range of motion.   Lymphadenopathy:      Cervical: No cervical adenopathy.   Skin:     General: Skin is warm and dry.   Neurological:      General: No focal deficit present.      Mental Status: She is alert.   Psychiatric:         Mood and Affect: Mood normal.         Assessment/Plan   Problem List Items Addressed This Visit       Primary hypertension     Monitor your blood pressure at home at least once a week and record.  " Please bring results to your next visit.  Take your medicine as prescribed.    Exercise at least 30 minutes daily.  Lose weight.   Avoid eating processed foods, canned foods, etc.    Limit adding salt at the table.             Relevant Medications    losartan (Cozaar) 50 mg tablet    Other Relevant Orders    Follow Up In [facility] - PCP - Established         There are no Patient Instructions on file for this visit.

## 2024-02-05 ASSESSMENT — ENCOUNTER SYMPTOMS
LIGHT-HEADEDNESS: 0
FEVER: 0
DIZZINESS: 0
VOMITING: 0
NAUSEA: 0
SHORTNESS OF BREATH: 0
COUGH: 1

## 2024-03-04 ENCOUNTER — OFFICE VISIT (OUTPATIENT)
Dept: PRIMARY CARE | Facility: CLINIC | Age: 44
End: 2024-03-04
Payer: COMMERCIAL

## 2024-03-04 VITALS
OXYGEN SATURATION: 98 % | RESPIRATION RATE: 18 BRPM | SYSTOLIC BLOOD PRESSURE: 146 MMHG | BODY MASS INDEX: 31.63 KG/M2 | WEIGHT: 230 LBS | TEMPERATURE: 98.7 F | DIASTOLIC BLOOD PRESSURE: 86 MMHG | HEART RATE: 60 BPM

## 2024-03-04 DIAGNOSIS — J06.9 ACUTE URI: Primary | ICD-10-CM

## 2024-03-04 PROCEDURE — 99213 OFFICE O/P EST LOW 20 MIN: CPT | Performed by: NURSE PRACTITIONER

## 2024-03-04 PROCEDURE — 3008F BODY MASS INDEX DOCD: CPT | Performed by: NURSE PRACTITIONER

## 2024-03-04 PROCEDURE — 3077F SYST BP >= 140 MM HG: CPT | Performed by: NURSE PRACTITIONER

## 2024-03-04 PROCEDURE — 3079F DIAST BP 80-89 MM HG: CPT | Performed by: NURSE PRACTITIONER

## 2024-03-04 PROCEDURE — 1036F TOBACCO NON-USER: CPT | Performed by: NURSE PRACTITIONER

## 2024-03-04 RX ORDER — AZITHROMYCIN 250 MG/1
TABLET, FILM COATED ORAL
Qty: 6 TABLET | Refills: 0 | Status: SHIPPED | OUTPATIENT
Start: 2024-03-04 | End: 2024-03-09

## 2024-03-04 ASSESSMENT — ENCOUNTER SYMPTOMS
FEVER: 0
COUGH: 1
ACTIVITY CHANGE: 0
APPETITE CHANGE: 0
CONSTIPATION: 0
NAUSEA: 0
RHINORRHEA: 1
VOMITING: 0
SLEEP DISTURBANCE: 0
CHILLS: 0
SORE THROAT: 0
DIARRHEA: 0
FATIGUE: 0
HEADACHES: 0

## 2024-03-04 NOTE — ASSESSMENT & PLAN NOTE
Antibiotics given for acute uri; Take full course until completed  Encouraged daily use of Flonase and Zyrtec for symptom support  Follow up with PCP if not improving over the next 2-3 days  ER for any SOB, difficulty breathing, uncontrolled fevers or worsening of symptoms

## 2024-03-04 NOTE — PROGRESS NOTES
Subjective   Patient ID: Vance Gage is a 43 y.o. female who presents for Cough.    Cold symptoms x1 month  Cough  Sneezing  Chest congestion  Nasal congestion  Nasal drainage  Post nasal drip    OCT- cough suppressant      Cough  Episode onset: 1 month. Cough characteristics: mostly dry, occasional phlegm. Associated symptoms include nasal congestion, postnasal drip and rhinorrhea. Pertinent negatives include no chills, ear pain, fever, headaches or sore throat. She has tried OTC cough suppressant for the symptoms.        Review of Systems   Constitutional:  Negative for activity change, appetite change, chills, fatigue and fever.   HENT:  Positive for congestion, postnasal drip and rhinorrhea. Negative for ear pain and sore throat.    Respiratory:  Positive for cough.    Gastrointestinal:  Negative for constipation, diarrhea, nausea and vomiting.   Neurological:  Negative for headaches.   Psychiatric/Behavioral:  Negative for sleep disturbance.        Objective   /86   Pulse 60   Temp 37.1 °C (98.7 °F)   Resp 18   Wt 104 kg (230 lb)   LMP  (LMP Unknown)   SpO2 98%   BMI 31.63 kg/m²     Physical Exam  Vitals reviewed.   Constitutional:       Appearance: Normal appearance.   HENT:      Head: Normocephalic.      Right Ear: Tympanic membrane, ear canal and external ear normal.      Left Ear: Tympanic membrane, ear canal and external ear normal.      Nose: Mucosal edema and congestion present.      Right Turbinates: Swollen.      Left Turbinates: Swollen.      Mouth/Throat:      Lips: Pink.      Mouth: Mucous membranes are moist.      Pharynx: Posterior oropharyngeal erythema present.   Eyes:      Extraocular Movements: Extraocular movements intact.      Conjunctiva/sclera: Conjunctivae normal.      Pupils: Pupils are equal, round, and reactive to light.   Cardiovascular:      Rate and Rhythm: Normal rate and regular rhythm.      Pulses: Normal pulses.      Heart sounds: Normal heart sounds.    Pulmonary:      Effort: Pulmonary effort is normal.      Breath sounds: Normal breath sounds.   Musculoskeletal:      Cervical back: Normal range of motion and neck supple.   Skin:     General: Skin is warm and dry.      Capillary Refill: Capillary refill takes less than 2 seconds.   Neurological:      General: No focal deficit present.      Mental Status: She is alert and oriented to person, place, and time.   Psychiatric:         Mood and Affect: Mood normal.         Behavior: Behavior normal.         Assessment/Plan   Problem List Items Addressed This Visit             ICD-10-CM    Acute URI - Primary J06.9     Antibiotics given for acute uri; Take full course until completed  Encouraged daily use of Flonase and Zyrtec for symptom support  Follow up with PCP if not improving over the next 2-3 days  ER for any SOB, difficulty breathing, uncontrolled fevers or worsening of symptoms           Relevant Medications    azithromycin (Zithromax) 250 mg tablet

## 2024-04-19 ENCOUNTER — OFFICE VISIT (OUTPATIENT)
Dept: PRIMARY CARE | Facility: CLINIC | Age: 44
End: 2024-04-19
Payer: COMMERCIAL

## 2024-04-19 VITALS
BODY MASS INDEX: 32.32 KG/M2 | HEART RATE: 82 BPM | TEMPERATURE: 98.3 F | WEIGHT: 235 LBS | RESPIRATION RATE: 18 BRPM | OXYGEN SATURATION: 97 % | SYSTOLIC BLOOD PRESSURE: 128 MMHG | DIASTOLIC BLOOD PRESSURE: 84 MMHG

## 2024-04-19 DIAGNOSIS — R39.9 UTI SYMPTOMS: Primary | ICD-10-CM

## 2024-04-19 LAB
POC APPEARANCE, URINE: ABNORMAL
POC BILIRUBIN, URINE: NEGATIVE
POC BLOOD, URINE: ABNORMAL
POC COLOR, URINE: YELLOW
POC GLUCOSE, URINE: NEGATIVE MG/DL
POC KETONES, URINE: NEGATIVE MG/DL
POC LEUKOCYTES, URINE: ABNORMAL
POC NITRITE,URINE: NEGATIVE
POC PH, URINE: 5.5 PH
POC PROTEIN, URINE: ABNORMAL MG/DL
POC SPECIFIC GRAVITY, URINE: 1.02
POC UROBILINOGEN, URINE: 0.2 EU/DL

## 2024-04-19 PROCEDURE — 3074F SYST BP LT 130 MM HG: CPT | Performed by: NURSE PRACTITIONER

## 2024-04-19 PROCEDURE — 87086 URINE CULTURE/COLONY COUNT: CPT

## 2024-04-19 PROCEDURE — 81002 URINALYSIS NONAUTO W/O SCOPE: CPT | Performed by: NURSE PRACTITIONER

## 2024-04-19 PROCEDURE — 1036F TOBACCO NON-USER: CPT | Performed by: NURSE PRACTITIONER

## 2024-04-19 PROCEDURE — 3079F DIAST BP 80-89 MM HG: CPT | Performed by: NURSE PRACTITIONER

## 2024-04-19 PROCEDURE — 87186 SC STD MICRODIL/AGAR DIL: CPT

## 2024-04-19 PROCEDURE — 99213 OFFICE O/P EST LOW 20 MIN: CPT | Performed by: NURSE PRACTITIONER

## 2024-04-19 RX ORDER — NITROFURANTOIN 25; 75 MG/1; MG/1
100 CAPSULE ORAL 2 TIMES DAILY
Qty: 10 CAPSULE | Refills: 0 | Status: SHIPPED | OUTPATIENT
Start: 2024-04-19 | End: 2024-04-22

## 2024-04-19 ASSESSMENT — ENCOUNTER SYMPTOMS
CHILLS: 0
FLANK PAIN: 0
APPETITE CHANGE: 0
FEVER: 0
CARDIOVASCULAR NEGATIVE: 1
NAUSEA: 0
RESPIRATORY NEGATIVE: 1
FATIGUE: 0
DIARRHEA: 0
ABDOMINAL PAIN: 0
VOMITING: 0
FREQUENCY: 0
DYSURIA: 0

## 2024-04-19 NOTE — PROGRESS NOTES
Subjective   Patient ID: Vance Gage is a 43 y.o. female who presents for UTI.    Patient has cloudy urine since yesterday. She has slight urgency but no frequency, hesitancy, or hematuria. Pt is feeling well otherwise. No nausea, abdominal pain. No chance of STIs per pt.     Review of Systems   Constitutional:  Negative for appetite change, chills, fatigue and fever.   HENT: Negative.     Respiratory: Negative.     Cardiovascular: Negative.    Gastrointestinal:  Negative for abdominal pain, diarrhea, nausea and vomiting.   Genitourinary:  Positive for urgency. Negative for dysuria, flank pain and frequency.        Cloudy urine     Objective   /84   Pulse 82   Temp 36.8 °C (98.3 °F) (Temporal)   Resp 18   Wt 107 kg (235 lb)   LMP  (LMP Unknown)   SpO2 97%   BMI 32.32 kg/m²     Physical Exam  Vitals reviewed.   Constitutional:       General: She is not in acute distress.     Appearance: Normal appearance. She is not ill-appearing or toxic-appearing.   HENT:      Head: Atraumatic.   Eyes:      Conjunctiva/sclera: Conjunctivae normal.   Cardiovascular:      Rate and Rhythm: Normal rate and regular rhythm.      Heart sounds: Normal heart sounds. No murmur heard.  Pulmonary:      Effort: Pulmonary effort is normal.      Breath sounds: Normal breath sounds.   Abdominal:      General: Bowel sounds are normal.      Palpations: Abdomen is soft.      Tenderness: There is no abdominal tenderness. There is no left CVA tenderness, guarding or rebound.   Skin:     General: Skin is warm and dry.   Neurological:      General: No focal deficit present.      Mental Status: She is alert.   Psychiatric:         Mood and Affect: Mood normal.     Assessment/Plan   Problem List Items Addressed This Visit    None  Visit Diagnoses         Codes    UTI symptoms    -  Primary R39.9    Relevant Medications    nitrofurantoin, macrocrystal-monohydrate, (Macrobid) 100 mg capsule    Other Relevant Orders    POCT UA (nonautomated)  manually resulted (Completed)    Urine Culture        UA indicative of a UTI. will start patient on macrobid at this time. will send urine out for culture. patient advised to call the office if symptoms persist in the next 2-3 days despite the use of the medication. patient advised to go to the ER for any fevers, chills, abdominal pain, nausea, vomiting or new/concerning symptoms; she agreed. will call pt when urine culture results become available.

## 2024-04-22 ENCOUNTER — TELEPHONE (OUTPATIENT)
Dept: PRIMARY CARE | Facility: CLINIC | Age: 44
End: 2024-04-22
Payer: COMMERCIAL

## 2024-04-22 DIAGNOSIS — N39.0 UTI (URINARY TRACT INFECTION), BACTERIAL: Primary | ICD-10-CM

## 2024-04-22 DIAGNOSIS — A49.9 UTI (URINARY TRACT INFECTION), BACTERIAL: Primary | ICD-10-CM

## 2024-04-22 LAB — BACTERIA UR CULT: ABNORMAL

## 2024-04-22 RX ORDER — AMOXICILLIN AND CLAVULANATE POTASSIUM 875; 125 MG/1; MG/1
875 TABLET, FILM COATED ORAL 2 TIMES DAILY
Qty: 14 TABLET | Refills: 0 | Status: SHIPPED | OUTPATIENT
Start: 2024-04-22 | End: 2024-04-29 | Stop reason: ALTCHOICE

## 2024-04-22 NOTE — TELEPHONE ENCOUNTER
Spoke to patient and advised that she does have a UTI and that the macrobid is ineffective for it. Pt did not even start the macrobid yet and will start on augmentin instead. Pt to follow up if no better despite the use of the medication.

## 2024-04-25 DIAGNOSIS — N30.00 ACUTE CYSTITIS WITHOUT HEMATURIA: Primary | ICD-10-CM

## 2024-04-25 RX ORDER — SULFAMETHOXAZOLE AND TRIMETHOPRIM 800; 160 MG/1; MG/1
1 TABLET ORAL 2 TIMES DAILY
Qty: 10 TABLET | Refills: 0 | Status: SHIPPED | OUTPATIENT
Start: 2024-04-25 | End: 2024-04-29 | Stop reason: ALTCHOICE

## 2024-04-29 ENCOUNTER — OFFICE VISIT (OUTPATIENT)
Dept: PRIMARY CARE | Facility: CLINIC | Age: 44
End: 2024-04-29
Payer: COMMERCIAL

## 2024-04-29 VITALS
SYSTOLIC BLOOD PRESSURE: 138 MMHG | TEMPERATURE: 98.3 F | DIASTOLIC BLOOD PRESSURE: 84 MMHG | WEIGHT: 236.38 LBS | HEART RATE: 84 BPM | BODY MASS INDEX: 32.51 KG/M2

## 2024-04-29 DIAGNOSIS — K64.9 ACUTE HEMORRHOID: Primary | ICD-10-CM

## 2024-04-29 DIAGNOSIS — I10 PRIMARY HYPERTENSION: ICD-10-CM

## 2024-04-29 DIAGNOSIS — N89.8 VAGINAL ITCHING: ICD-10-CM

## 2024-04-29 DIAGNOSIS — E66.09 CLASS 1 OBESITY DUE TO EXCESS CALORIES WITHOUT SERIOUS COMORBIDITY WITH BODY MASS INDEX (BMI) OF 34.0 TO 34.9 IN ADULT: ICD-10-CM

## 2024-04-29 PROBLEM — J06.9 ACUTE URI: Status: RESOLVED | Noted: 2024-03-04 | Resolved: 2024-04-29

## 2024-04-29 PROCEDURE — 3008F BODY MASS INDEX DOCD: CPT | Performed by: FAMILY MEDICINE

## 2024-04-29 PROCEDURE — 3079F DIAST BP 80-89 MM HG: CPT | Performed by: FAMILY MEDICINE

## 2024-04-29 PROCEDURE — 99214 OFFICE O/P EST MOD 30 MIN: CPT | Performed by: FAMILY MEDICINE

## 2024-04-29 PROCEDURE — 3075F SYST BP GE 130 - 139MM HG: CPT | Performed by: FAMILY MEDICINE

## 2024-04-29 PROCEDURE — 1036F TOBACCO NON-USER: CPT | Performed by: FAMILY MEDICINE

## 2024-04-29 ASSESSMENT — ENCOUNTER SYMPTOMS
FEVER: 0
SHORTNESS OF BREATH: 0
VOMITING: 0
DIZZINESS: 0
LIGHT-HEADEDNESS: 0
NAUSEA: 0

## 2024-04-29 NOTE — ASSESSMENT & PLAN NOTE
We talked about a low-salt diet specifically avoiding lunch meats prepared foods processed foods canned foods and boxed pasta's and rices with packets of flavoring.  Continue losartan

## 2024-04-29 NOTE — ASSESSMENT & PLAN NOTE
For your undergarments use Free and clear laundry detergents and avoid anything with excess smell.  Also you may try topical Monistat or Gyne-Lotrimin.  Please come back to the office if your symptoms persist

## 2024-04-29 NOTE — ASSESSMENT & PLAN NOTE
Local care with soaking in a tub and Preparation H.  We also discussed avoiding constipation avoiding excessive straining on stool and avoiding waiting too long to have a bowel movement

## 2024-04-29 NOTE — PROGRESS NOTES
Subjective   Vance Gage is a 43 y.o. female who presents for Hypertension (3mon follow up/Doing well with new med).    Last time we were together she was switched from lisinopril to losartan.   She is tolerating this medicine very well and her blood pressure has been stable    Recently noticed she has hemorrhoids.  She denies constipation or prior history of hemorrhoids.  She denies bleeding.  She just noticed after having had a large bowel movement she developed anal itching    Also noticed itching of her internal labia.  No vaginal discharge    Still trying to lose weight.  Having a hard time.  She does exercise approximately 3 times a week, she eats 3 meals a day that are healthy but thinks perhaps her portions are too large.  She does not drink pop or eat an excessive amount of snack foods         Review of Systems   Constitutional:  Negative for fever.   Respiratory:  Negative for shortness of breath.    Cardiovascular:  Negative for chest pain.   Gastrointestinal:  Negative for nausea and vomiting.   Neurological:  Negative for dizziness and light-headedness.   All other systems reviewed and are negative.      Objective   /84   Pulse 84   Temp 36.8 °C (98.3 °F)   Wt 107 kg (236 lb 6 oz)   LMP  (LMP Unknown)   BMI 32.51 kg/m²     Physical Exam  HENT:      Head: Normocephalic and atraumatic.      Mouth/Throat:      Mouth: Mucous membranes are moist.      Pharynx: Oropharynx is clear.   Eyes:      Extraocular Movements: Extraocular movements intact.      Pupils: Pupils are equal, round, and reactive to light.   Cardiovascular:      Rate and Rhythm: Normal rate and regular rhythm.      Heart sounds: Normal heart sounds.   Pulmonary:      Effort: Pulmonary effort is normal.      Breath sounds: Normal breath sounds.   Musculoskeletal:         General: Normal range of motion.      Cervical back: Normal range of motion.   Lymphadenopathy:      Cervical: No cervical adenopathy.   Skin:     General: Skin  is warm and dry.   Neurological:      General: No focal deficit present.      Mental Status: She is alert.   Psychiatric:         Mood and Affect: Mood normal.         Assessment/Plan   Problem List Items Addressed This Visit       Class 1 obesity with body mass index (BMI) of 34.0 to 34.9 in adult     We discussed diet and exercise.  Try to decrease your portions         Primary hypertension     We talked about a low-salt diet specifically avoiding lunch meats prepared foods processed foods canned foods and boxed pasta's and rices with packets of flavoring.  Continue losartan         Acute hemorrhoid - Primary     Local care with soaking in a tub and Preparation H.  We also discussed avoiding constipation avoiding excessive straining on stool and avoiding waiting too long to have a bowel movement         Vaginal itching     For your undergarments use Free and clear laundry detergents and avoid anything with excess smell.  Also you may try topical Monistat or Gyne-Lotrimin.  Please come back to the office if your symptoms persist              Patient Instructions   Please keep your previously scheduled follow up appointment.  Nice to see you today!

## 2024-06-14 DIAGNOSIS — N30.00 ACUTE CYSTITIS WITHOUT HEMATURIA: Primary | ICD-10-CM

## 2024-06-14 RX ORDER — SULFAMETHOXAZOLE AND TRIMETHOPRIM 800; 160 MG/1; MG/1
1 TABLET ORAL 2 TIMES DAILY
Qty: 10 TABLET | Refills: 0 | Status: SHIPPED | OUTPATIENT
Start: 2024-06-14 | End: 2024-06-19

## 2024-06-28 ENCOUNTER — TELEPHONE (OUTPATIENT)
Dept: SCHEDULING | Age: 44
End: 2024-06-28

## 2024-06-28 ENCOUNTER — TELEPHONE (OUTPATIENT)
Dept: PRIMARY CARE | Facility: CLINIC | Age: 44
End: 2024-06-28

## 2024-06-28 ENCOUNTER — OFFICE VISIT (OUTPATIENT)
Dept: PRIMARY CARE | Facility: CLINIC | Age: 44
End: 2024-06-28
Payer: COMMERCIAL

## 2024-06-28 VITALS
BODY MASS INDEX: 32.59 KG/M2 | WEIGHT: 237 LBS | HEART RATE: 57 BPM | RESPIRATION RATE: 20 BRPM | SYSTOLIC BLOOD PRESSURE: 140 MMHG | OXYGEN SATURATION: 98 % | DIASTOLIC BLOOD PRESSURE: 90 MMHG | TEMPERATURE: 98.7 F

## 2024-06-28 DIAGNOSIS — R35.0 URINARY FREQUENCY: ICD-10-CM

## 2024-06-28 LAB
POC APPEARANCE, URINE: ABNORMAL
POC BILIRUBIN, URINE: NEGATIVE
POC BLOOD, URINE: NEGATIVE
POC COLOR, URINE: YELLOW
POC GLUCOSE, URINE: NEGATIVE MG/DL
POC KETONES, URINE: NEGATIVE MG/DL
POC LEUKOCYTES, URINE: ABNORMAL
POC NITRITE,URINE: NEGATIVE
POC PH, URINE: 5 PH
POC PROTEIN, URINE: NEGATIVE MG/DL
POC SPECIFIC GRAVITY, URINE: 1.01
POC UROBILINOGEN, URINE: 0.2 EU/DL

## 2024-06-28 PROCEDURE — 3008F BODY MASS INDEX DOCD: CPT | Performed by: NURSE PRACTITIONER

## 2024-06-28 PROCEDURE — 99213 OFFICE O/P EST LOW 20 MIN: CPT | Performed by: NURSE PRACTITIONER

## 2024-06-28 PROCEDURE — 3080F DIAST BP >= 90 MM HG: CPT | Performed by: NURSE PRACTITIONER

## 2024-06-28 PROCEDURE — 1036F TOBACCO NON-USER: CPT | Performed by: NURSE PRACTITIONER

## 2024-06-28 PROCEDURE — 87086 URINE CULTURE/COLONY COUNT: CPT

## 2024-06-28 PROCEDURE — 81002 URINALYSIS NONAUTO W/O SCOPE: CPT | Performed by: NURSE PRACTITIONER

## 2024-06-28 PROCEDURE — 3077F SYST BP >= 140 MM HG: CPT | Performed by: NURSE PRACTITIONER

## 2024-06-28 ASSESSMENT — ENCOUNTER SYMPTOMS
FLANK PAIN: 0
FATIGUE: 0
RESPIRATORY NEGATIVE: 1
GASTROINTESTINAL NEGATIVE: 1
CHILLS: 0
FEVER: 0
HEMATURIA: 0
FREQUENCY: 1
DYSURIA: 0

## 2024-06-28 NOTE — TELEPHONE ENCOUNTER
----- Message from Anastasiia Rios sent at 6/28/2024  2:44 PM EDT -----  Urology appointment scheduled by Subha Macias for 9:00am 8/12/24.  Patient aware.  ----- Message -----  From: MARI AINES Mon  Sent: 6/28/2024   1:22 PM EDT  To: Anastasiia Rios    Referral for urology placed. Please help pt schedule

## 2024-06-28 NOTE — PROGRESS NOTES
Subjective   Patient ID: Vance Gage is a 43 y.o. female who presents for UTI.    Pt was in [place] on 6/12/24 and had UTI symptoms; blood in her urine, urgency and frequency. Patient called her PCP and bactrim was called in for her on 6/14/24, for five days. Pt says that she improved mostly. She still had some bladder pressure and painful intercourse. Now, patient has an uncomfortable feeling within the vagina along with urinary frequency. No blood in the urine. No abdominal pain, nausea or vomiting.    Review of Systems   Constitutional:  Negative for chills, fatigue and fever.   HENT: Negative.     Respiratory: Negative.     Gastrointestinal: Negative.    Genitourinary:  Positive for frequency and urgency. Negative for dysuria, flank pain and hematuria.       Objective   /90   Pulse 57   Temp 37.1 °C (98.7 °F)   Resp 20   Wt 108 kg (237 lb)   LMP  (LMP Unknown)   SpO2 98%   BMI 32.59 kg/m²     Physical Exam  Vitals reviewed.   Constitutional:       General: She is not in acute distress.     Appearance: Normal appearance. She is not ill-appearing or toxic-appearing.   HENT:      Head: Atraumatic.   Eyes:      Conjunctiva/sclera: Conjunctivae normal.   Cardiovascular:      Rate and Rhythm: Normal rate and regular rhythm.      Heart sounds: Normal heart sounds. No murmur heard.  Pulmonary:      Effort: Pulmonary effort is normal.      Breath sounds: Normal breath sounds.   Abdominal:      General: Bowel sounds are normal.      Palpations: Abdomen is soft.      Tenderness: There is no abdominal tenderness. There is no left CVA tenderness, guarding or rebound.      Comments: No suprapubic pressure to palpation   Skin:     General: Skin is warm and dry.   Neurological:      General: No focal deficit present.      Mental Status: She is alert.   Psychiatric:         Mood and Affect: Mood normal.     Assessment/Plan   Problem List Items Addressed This Visit    None  Visit Diagnoses         Codes    Urinary  frequency     R35.0    Relevant Orders    POCT UA (nonautomated) manually resulted (Completed)    Urine Culture    Referral to Urogynecology    Referral to Urology         UA done. Will send urine culture out. Patient would like to wait for urine culture prior to treating. Will refer to urology as this has been an ongoing issue. Our  staff will help pt make that appt. Patient advised go to the ER for any worsening discomfort or new/concerning symptoms; she agreed. Will follow up when results become available.

## 2024-06-30 ENCOUNTER — TELEPHONE (OUTPATIENT)
Dept: PRIMARY CARE | Facility: CLINIC | Age: 44
End: 2024-06-30
Payer: COMMERCIAL

## 2024-06-30 LAB — BACTERIA UR CULT: NORMAL

## 2024-06-30 NOTE — TELEPHONE ENCOUNTER
Spoke to patient and relayed results of negative urine testing. Patient has appt with urology in August. Will try to have pt follow up with Dr. Phillips sooner. No further questions per pt

## 2024-07-01 ENCOUNTER — TELEPHONE (OUTPATIENT)
Dept: PRIMARY CARE | Facility: CLINIC | Age: 44
End: 2024-07-01
Payer: COMMERCIAL

## 2024-07-01 NOTE — TELEPHONE ENCOUNTER
----- Message from Anastasiia Rios sent at 7/1/2024  1:40 PM EDT -----  Patient scheduled to see Subha Harper CNP on 7/10/24 at 1:20pm.  Called patient and informed of visit date and time.  Appointment with Bibi Phillips still scheduled just in case.  ----- Message -----  From: MARIA INES Mon  Sent: 7/1/2024  12:50 PM EDT  To: Anastasiia Rios    Can you please call Dr. Bibi Phillips's office and to see if they have a sooner appt for patient for follow up on bladder issues?   ----- Message -----  From: MARIA INES Mon  Sent: 6/30/2024  11:31 AM EDT  To: MARIA INES Mon    F/u

## 2024-07-10 ENCOUNTER — APPOINTMENT (OUTPATIENT)
Dept: PRIMARY CARE | Facility: CLINIC | Age: 44
End: 2024-07-10
Payer: COMMERCIAL

## 2024-07-10 VITALS
SYSTOLIC BLOOD PRESSURE: 145 MMHG | BODY MASS INDEX: 32.75 KG/M2 | TEMPERATURE: 98.3 F | WEIGHT: 238.13 LBS | HEART RATE: 59 BPM | DIASTOLIC BLOOD PRESSURE: 107 MMHG

## 2024-07-10 DIAGNOSIS — N39.0 URINARY TRACT INFECTION WITHOUT HEMATURIA, SITE UNSPECIFIED: Primary | ICD-10-CM

## 2024-07-10 LAB
POC APPEARANCE, URINE: CLEAR
POC BILIRUBIN, URINE: NEGATIVE
POC BLOOD, URINE: NEGATIVE
POC COLOR, URINE: ABNORMAL
POC GLUCOSE, URINE: NEGATIVE MG/DL
POC KETONES, URINE: NEGATIVE MG/DL
POC LEUKOCYTES, URINE: NEGATIVE
POC NITRITE,URINE: NEGATIVE
POC PH, URINE: 5 PH
POC PROTEIN, URINE: ABNORMAL MG/DL
POC SPECIFIC GRAVITY, URINE: 1.02
POC UROBILINOGEN, URINE: 0.2 EU/DL

## 2024-07-10 PROCEDURE — 99214 OFFICE O/P EST MOD 30 MIN: CPT | Performed by: NURSE PRACTITIONER

## 2024-07-10 PROCEDURE — 3077F SYST BP >= 140 MM HG: CPT | Performed by: NURSE PRACTITIONER

## 2024-07-10 PROCEDURE — 3080F DIAST BP >= 90 MM HG: CPT | Performed by: NURSE PRACTITIONER

## 2024-07-10 PROCEDURE — 3008F BODY MASS INDEX DOCD: CPT | Performed by: NURSE PRACTITIONER

## 2024-07-10 PROCEDURE — 81002 URINALYSIS NONAUTO W/O SCOPE: CPT | Performed by: NURSE PRACTITIONER

## 2024-07-10 PROCEDURE — 1036F TOBACCO NON-USER: CPT | Performed by: NURSE PRACTITIONER

## 2024-07-10 PROCEDURE — 87086 URINE CULTURE/COLONY COUNT: CPT

## 2024-07-10 RX ORDER — SULFAMETHOXAZOLE AND TRIMETHOPRIM 800; 160 MG/1; MG/1
1 TABLET ORAL 2 TIMES DAILY
Qty: 20 TABLET | Refills: 0 | Status: SHIPPED | OUTPATIENT
Start: 2024-07-10 | End: 2024-07-20

## 2024-07-10 ASSESSMENT — ENCOUNTER SYMPTOMS
HEMATURIA: 0
FLANK PAIN: 0
FREQUENCY: 1

## 2024-07-10 NOTE — PROGRESS NOTES
Subjective   Patient ID: Vance Gage is a 43 y.o. female who presents for UTI (Was seen in June at [facility] and was referred to urology - has appt in Aug/Symptoms went away but came back again/Has urgency and frequency, no abd pressure).    HPI     Review of Systems   Genitourinary:  Positive for frequency and urgency. Negative for flank pain and hematuria.       Objective   BP (!) 145/107   Pulse 59   Temp 36.8 °C (98.3 °F)   Wt 108 kg (238 lb 2 oz)   LMP  (LMP Unknown)   BMI 32.75 kg/m²     Physical Exam  Vitals and nursing note reviewed.   Constitutional:       General: She is not in acute distress.  HENT:      Head: Normocephalic and atraumatic.   Pulmonary:      Effort: Pulmonary effort is normal.   Skin:     General: Skin is warm and dry.   Neurological:      Mental Status: She is alert and oriented to person, place, and time.   Psychiatric:         Mood and Affect: Mood normal.         Assessment/Plan   Problem List Items Addressed This Visit    None  Visit Diagnoses         Codes    Urinary tract infection without hematuria, site unspecified    -  Primary N39.0    Relevant Medications    sulfamethoxazole-trimethoprim (Bactrim DS) 800-160 mg tablet    Other Relevant Orders    POCT UA (nonautomated) manually resulted (Completed)    Urine Culture

## 2024-07-11 LAB — BACTERIA UR CULT: NORMAL

## 2024-07-23 ENCOUNTER — APPOINTMENT (OUTPATIENT)
Dept: PRIMARY CARE | Facility: CLINIC | Age: 44
End: 2024-07-23
Payer: COMMERCIAL

## 2024-08-06 DIAGNOSIS — I10 PRIMARY HYPERTENSION: ICD-10-CM

## 2024-08-07 RX ORDER — LOSARTAN POTASSIUM 50 MG/1
50 TABLET ORAL DAILY
Qty: 90 TABLET | Refills: 3 | Status: SHIPPED | OUTPATIENT
Start: 2024-08-07 | End: 2025-08-07

## 2024-08-12 ENCOUNTER — APPOINTMENT (OUTPATIENT)
Dept: UROLOGY | Facility: CLINIC | Age: 44
End: 2024-08-12
Payer: COMMERCIAL

## 2024-08-12 VITALS
SYSTOLIC BLOOD PRESSURE: 141 MMHG | HEIGHT: 71 IN | WEIGHT: 240 LBS | BODY MASS INDEX: 33.6 KG/M2 | HEART RATE: 73 BPM | TEMPERATURE: 98.2 F | DIASTOLIC BLOOD PRESSURE: 87 MMHG

## 2024-08-12 DIAGNOSIS — R31.0 GROSS HEMATURIA: Primary | ICD-10-CM

## 2024-08-12 DIAGNOSIS — R35.0 URINARY FREQUENCY: ICD-10-CM

## 2024-08-12 DIAGNOSIS — R30.0 DYSURIA: ICD-10-CM

## 2024-08-12 LAB
POC APPEARANCE, URINE: CLEAR
POC BILIRUBIN, URINE: NEGATIVE
POC BLOOD, URINE: ABNORMAL
POC COLOR, URINE: YELLOW
POC GLUCOSE, URINE: NEGATIVE MG/DL
POC KETONES, URINE: NEGATIVE MG/DL
POC LEUKOCYTES, URINE: NEGATIVE
POC NITRITE,URINE: NEGATIVE
POC PH, URINE: 5.5 PH
POC PROTEIN, URINE: ABNORMAL MG/DL
POC SPECIFIC GRAVITY, URINE: >=1.03
POC UROBILINOGEN, URINE: 0.2 EU/DL

## 2024-08-12 PROCEDURE — 3008F BODY MASS INDEX DOCD: CPT | Performed by: NURSE PRACTITIONER

## 2024-08-12 PROCEDURE — 1036F TOBACCO NON-USER: CPT | Performed by: NURSE PRACTITIONER

## 2024-08-12 PROCEDURE — 3079F DIAST BP 80-89 MM HG: CPT | Performed by: NURSE PRACTITIONER

## 2024-08-12 PROCEDURE — 81003 URINALYSIS AUTO W/O SCOPE: CPT | Performed by: NURSE PRACTITIONER

## 2024-08-12 PROCEDURE — 99204 OFFICE O/P NEW MOD 45 MIN: CPT | Performed by: NURSE PRACTITIONER

## 2024-08-12 PROCEDURE — G2211 COMPLEX E/M VISIT ADD ON: HCPCS | Performed by: NURSE PRACTITIONER

## 2024-08-12 PROCEDURE — 3077F SYST BP >= 140 MM HG: CPT | Performed by: NURSE PRACTITIONER

## 2024-08-12 RX ORDER — METHENAMINE, SODIUM PHOSPHATE, MONOBASIC, MONOHYDRATE, PHENYL SALICYLATE, METHYLENE BLUE, AND HYOSCYAMINE SULFATE 118; 40.8; 36; 10; .12 MG/1; MG/1; MG/1; MG/1; MG/1
1 CAPSULE ORAL EVERY 6 HOURS PRN
Qty: 28 CAPSULE | Refills: 1 | Status: SHIPPED | OUTPATIENT
Start: 2024-08-12 | End: 2024-10-07

## 2024-08-12 RX ORDER — ESTRADIOL 0.1 MG/G
CREAM VAGINAL
Qty: 42.5 G | Refills: 5 | Status: SHIPPED | OUTPATIENT
Start: 2024-08-12 | End: 2025-08-12

## 2024-08-12 NOTE — PROGRESS NOTES
Subjective   Patient ID: Vance Gage is a 43 y.o. female who presents for Difficulty Urinating.  Presents for eval of frequent dysuria not always in the context of infection. One culture proven infection in April 2024. Endorses years worth of frequent UTI symptoms. She states dehydration and intercourse tend to exacerbate symptoms. One episode of gross hematuria in June 2024 when he was in [place] with severe pain. Endorses primary bladder pain, not urethral pain. Did not have a UTI at that time.     Drinks cranberry juice with water, Dr. Pepper and sprite, as well as water     Feels that she empties bladder well when she urinates.     Hx of uterine fibroids s/p hysterectomy in 2021. Denies s/s of prolapse and bulge symptoms.     Never smoked. No chemical or toxin exposure.     Sexually active with spouse, some pain with intercourse. Feels very dry        Review of Systems   All other systems reviewed and are negative.      Objective   Physical Exam  Vitals reviewed.     Constitutional: Patient generally appears stated age. Good nutrition. No deformities. Good attention to grooming.  Neck: No neck masses. Good symmetry. No crepitus. Normal thyroid size and consistency with no masses.  Respiratory: Normal respiratory effort. No intercostal retractions. No use of accessory muscles.  Cardiovascular: Examination of the peripheral vascular system reveals no swelling or varicosities with good pulses. Normal extremity temperature, no edema or tenderness.  Abdomen: Examination of the abdomen reveals no masses or tenderness. No hernias detected. Normal liver and spleen size.   Lymphatic: No palpable lymph nodes in the neck, axilla, groin or other locations  Skin: Inspection of the skin reveals no rashes, lesions, ulcers.  Neurologic/Psychiatric: Oriented to time, place and person. Normal mood and affect with no depression, anxiety, or agitation.    Office Visit on 08/12/2024   Component Date Value Ref Range Status    POC  Color, Urine 08/12/2024 Yellow  Straw, Yellow, Light-Yellow Final    POC Appearance, Urine 08/12/2024 Clear  Clear Final    POC Glucose, Urine 08/12/2024 NEGATIVE  NEGATIVE mg/dl Final    POC Bilirubin, Urine 08/12/2024 NEGATIVE  NEGATIVE Final    POC Ketones, Urine 08/12/2024 NEGATIVE  NEGATIVE mg/dl Final    POC Specific Gravity, Urine 08/12/2024 >=1.030  1.005 - 1.035 Final    POC Blood, Urine 08/12/2024 SMALL (1+) (A)  NEGATIVE Final    POC PH, Urine 08/12/2024 5.5  No Reference Range Established PH Final    POC Protein, Urine 08/12/2024 TRACE (A)  NEGATIVE, 30 (1+) mg/dl Final    POC Urobilinogen, Urine 08/12/2024 0.2  0.2, 1.0 EU/DL Final    Poc Nitrite, Urine 08/12/2024 NEGATIVE  NEGATIVE Final    POC Leukocytes, Urine 08/12/2024 NEGATIVE  NEGATIVE Final   Office Visit on 07/10/2024   Component Date Value Ref Range Status    POC Color, Urine 07/10/2024 Koki (A)  Straw, Yellow, Light-Yellow Final    POC Appearance, Urine 07/10/2024 Clear  Clear Final    POC Glucose, Urine 07/10/2024 NEGATIVE  NEGATIVE mg/dl Final    POC Bilirubin, Urine 07/10/2024 NEGATIVE  NEGATIVE Final    POC Ketones, Urine 07/10/2024 NEGATIVE  NEGATIVE mg/dl Final    POC Specific Gravity, Urine 07/10/2024 1.020  1.005 - 1.035 Final    POC Blood, Urine 07/10/2024 NEGATIVE  NEGATIVE Final    POC PH, Urine 07/10/2024 5.0  No Reference Range Established PH Final    POC Protein, Urine 07/10/2024 TRACE (A)  NEGATIVE, 30 (1+) mg/dl Final    POC Urobilinogen, Urine 07/10/2024 0.2  0.2, 1.0 EU/DL Final    Poc Nitrite, Urine 07/10/2024 NEGATIVE  NEGATIVE Final    POC Leukocytes, Urine 07/10/2024 NEGATIVE  NEGATIVE Final    Urine Culture 07/10/2024 No significant growth   Final   Office Visit on 06/28/2024   Component Date Value Ref Range Status    POC Color, Urine 06/28/2024 Yellow  Straw, Yellow, Light-Yellow Final    POC Appearance, Urine 06/28/2024 Cloudy (A)  Clear Final    POC Glucose, Urine 06/28/2024 NEGATIVE  NEGATIVE mg/dl Final    POC  Bilirubin, Urine 06/28/2024 NEGATIVE  NEGATIVE Final    POC Ketones, Urine 06/28/2024 NEGATIVE  NEGATIVE mg/dl Final    POC Specific Gravity, Urine 06/28/2024 1.010  1.005 - 1.035 Final    POC Blood, Urine 06/28/2024 NEGATIVE  NEGATIVE Final    POC PH, Urine 06/28/2024 5.0  No Reference Range Established PH Final    POC Protein, Urine 06/28/2024 NEGATIVE  NEGATIVE, 30 (1+) mg/dl Final    POC Urobilinogen, Urine 06/28/2024 0.2  0.2, 1.0 EU/DL Final    Poc Nitrite, Urine 06/28/2024 NEGATIVE  NEGATIVE Final    POC Leukocytes, Urine 06/28/2024 MODERATE (2+) (A)  NEGATIVE Final    Urine Culture 06/28/2024 No significant growth   Final         Assessment/Plan   Diagnoses and all orders for this visit:  Gross hematuria  -     estradiol (Estrace) 0.01 % (0.1 mg/gram) vaginal cream; Apply pea sized amount into vagina nightly for 2 weeks, then 3 times per week.  -     methen-m.blue-s.phos-phsal-hyo (UribeL) 118-10-40.8-36 mg capsule; Take 1 capsule by mouth every 6 hours if needed (dysuria).  -     Urinalysis with Reflex Culture and Microscopic; Standing  -     Basic metabolic panel; Future  -     CT urography w 3D volume rendered imaging; Future  -     Cystourethroscopy; Future  Urinary frequency  -     Referral to Urology  -     POCT UA Automated manually resulted  -     Post-Void Residual  -     estradiol (Estrace) 0.01 % (0.1 mg/gram) vaginal cream; Apply pea sized amount into vagina nightly for 2 weeks, then 3 times per week.  -     methen-m.blue-s.phos-phsal-hyo (UribeL) 118-10-40.8-36 mg capsule; Take 1 capsule by mouth every 6 hours if needed (dysuria).  -     Urinalysis with Reflex Culture and Microscopic; Standing  -     Basic metabolic panel; Future  -     Cystourethroscopy; Future  Dysuria  -     estradiol (Estrace) 0.01 % (0.1 mg/gram) vaginal cream; Apply pea sized amount into vagina nightly for 2 weeks, then 3 times per week.  -     methen-m.blue-s.phos-phsal-hyo (UribeL) 118-10-40.8-36 mg capsule; Take 1  capsule by mouth every 6 hours if needed (dysuria).  -     Urinalysis with Reflex Culture and Microscopic; Standing  -     Basic metabolic panel; Future  -     Cystourethroscopy; Future    Reviewed AUA guidelines for further eval. Will schedule accordingly. Given script for uribel PRN and will initiate vaginal estradiol. Patient in agreement with plan. Will be scheduled accordingly.          Sania Orr, VINH-CNP 08/12/24 9:24 AM

## 2024-09-04 ENCOUNTER — HOSPITAL ENCOUNTER (OUTPATIENT)
Dept: RADIOLOGY | Facility: CLINIC | Age: 44
Discharge: HOME | End: 2024-09-04
Payer: COMMERCIAL

## 2024-09-04 ENCOUNTER — LAB (OUTPATIENT)
Dept: LAB | Facility: LAB | Age: 44
End: 2024-09-04
Payer: COMMERCIAL

## 2024-09-04 DIAGNOSIS — R31.0 GROSS HEMATURIA: ICD-10-CM

## 2024-09-04 DIAGNOSIS — R30.0 DYSURIA: ICD-10-CM

## 2024-09-04 DIAGNOSIS — R35.0 URINARY FREQUENCY: ICD-10-CM

## 2024-09-04 LAB
ANION GAP SERPL CALC-SCNC: 9 MMOL/L (ref 10–20)
APPEARANCE UR: CLEAR
BILIRUB UR STRIP.AUTO-MCNC: NEGATIVE MG/DL
BUN SERPL-MCNC: 12 MG/DL (ref 6–23)
CALCIUM SERPL-MCNC: 8.6 MG/DL (ref 8.6–10.3)
CHLORIDE SERPL-SCNC: 101 MMOL/L (ref 98–107)
CO2 SERPL-SCNC: 30 MMOL/L (ref 21–32)
COLOR UR: COLORLESS
CREAT SERPL-MCNC: 0.99 MG/DL (ref 0.5–1.05)
EGFRCR SERPLBLD CKD-EPI 2021: 73 ML/MIN/1.73M*2
GLUCOSE SERPL-MCNC: 80 MG/DL (ref 74–99)
GLUCOSE UR STRIP.AUTO-MCNC: NORMAL MG/DL
KETONES UR STRIP.AUTO-MCNC: NEGATIVE MG/DL
LEUKOCYTE ESTERASE UR QL STRIP.AUTO: NEGATIVE
NITRITE UR QL STRIP.AUTO: NEGATIVE
PH UR STRIP.AUTO: 6 [PH]
POTASSIUM SERPL-SCNC: 4 MMOL/L (ref 3.5–5.3)
PROT UR STRIP.AUTO-MCNC: NEGATIVE MG/DL
RBC # UR STRIP.AUTO: NEGATIVE /UL
SODIUM SERPL-SCNC: 136 MMOL/L (ref 136–145)
SP GR UR STRIP.AUTO: 1.02
UROBILINOGEN UR STRIP.AUTO-MCNC: NORMAL MG/DL

## 2024-09-04 PROCEDURE — 76377 3D RENDER W/INTRP POSTPROCES: CPT

## 2024-09-04 PROCEDURE — 36415 COLL VENOUS BLD VENIPUNCTURE: CPT

## 2024-09-04 PROCEDURE — 81003 URINALYSIS AUTO W/O SCOPE: CPT

## 2024-09-04 PROCEDURE — 2550000001 HC RX 255 CONTRASTS: Performed by: NURSE PRACTITIONER

## 2024-09-04 PROCEDURE — 80048 BASIC METABOLIC PNL TOTAL CA: CPT

## 2024-09-05 LAB — HOLD SPECIMEN: NORMAL

## 2024-10-03 ENCOUNTER — APPOINTMENT (OUTPATIENT)
Dept: UROLOGY | Facility: CLINIC | Age: 44
End: 2024-10-03
Payer: COMMERCIAL

## 2024-10-03 VITALS
BODY MASS INDEX: 33.47 KG/M2 | WEIGHT: 240 LBS | HEART RATE: 75 BPM | TEMPERATURE: 99.1 F | SYSTOLIC BLOOD PRESSURE: 186 MMHG | DIASTOLIC BLOOD PRESSURE: 127 MMHG

## 2024-10-03 DIAGNOSIS — R31.0 GROSS HEMATURIA: ICD-10-CM

## 2024-10-03 LAB
POC APPEARANCE, URINE: CLEAR
POC BILIRUBIN, URINE: NEGATIVE
POC BLOOD, URINE: ABNORMAL
POC COLOR, URINE: YELLOW
POC GLUCOSE, URINE: NEGATIVE MG/DL
POC KETONES, URINE: NEGATIVE MG/DL
POC LEUKOCYTES, URINE: NEGATIVE
POC NITRITE,URINE: NEGATIVE
POC PH, URINE: 6.5 PH
POC PROTEIN, URINE: NEGATIVE MG/DL
POC SPECIFIC GRAVITY, URINE: >=1.03
POC UROBILINOGEN, URINE: 0.2 EU/DL

## 2024-10-03 PROCEDURE — 81003 URINALYSIS AUTO W/O SCOPE: CPT | Performed by: UROLOGY

## 2024-10-03 RX ORDER — LIDOCAINE HYDROCHLORIDE 20 MG/ML
1 JELLY TOPICAL ONCE
Status: CANCELLED | OUTPATIENT
Start: 2024-10-03 | End: 2024-10-03

## 2024-10-28 ENCOUNTER — APPOINTMENT (OUTPATIENT)
Dept: PRIMARY CARE | Facility: CLINIC | Age: 44
End: 2024-10-28
Payer: COMMERCIAL

## 2024-10-28 VITALS
TEMPERATURE: 98.2 F | WEIGHT: 237.5 LBS | HEIGHT: 71 IN | SYSTOLIC BLOOD PRESSURE: 175 MMHG | HEART RATE: 58 BPM | DIASTOLIC BLOOD PRESSURE: 118 MMHG | BODY MASS INDEX: 33.25 KG/M2

## 2024-10-28 DIAGNOSIS — Z00.00 WELL ADULT HEALTH CHECK: Primary | ICD-10-CM

## 2024-10-28 DIAGNOSIS — I10 PRIMARY HYPERTENSION: ICD-10-CM

## 2024-10-28 DIAGNOSIS — E55.9 VITAMIN D DEFICIENCY: ICD-10-CM

## 2024-10-28 PROBLEM — B37.31 VAGINAL CANDIDIASIS: Status: RESOLVED | Noted: 2023-01-26 | Resolved: 2024-10-28

## 2024-10-28 PROCEDURE — 99213 OFFICE O/P EST LOW 20 MIN: CPT | Performed by: FAMILY MEDICINE

## 2024-10-28 PROCEDURE — 3080F DIAST BP >= 90 MM HG: CPT | Performed by: FAMILY MEDICINE

## 2024-10-28 PROCEDURE — 90715 TDAP VACCINE 7 YRS/> IM: CPT | Performed by: FAMILY MEDICINE

## 2024-10-28 PROCEDURE — 99396 PREV VISIT EST AGE 40-64: CPT | Performed by: FAMILY MEDICINE

## 2024-10-28 PROCEDURE — 90471 IMMUNIZATION ADMIN: CPT | Performed by: FAMILY MEDICINE

## 2024-10-28 PROCEDURE — 3077F SYST BP >= 140 MM HG: CPT | Performed by: FAMILY MEDICINE

## 2024-10-28 PROCEDURE — 3008F BODY MASS INDEX DOCD: CPT | Performed by: FAMILY MEDICINE

## 2024-10-28 RX ORDER — LOSARTAN POTASSIUM 100 MG/1
100 TABLET ORAL DAILY
Qty: 90 TABLET | Refills: 0 | Status: SHIPPED | OUTPATIENT
Start: 2024-10-28 | End: 2025-01-26

## 2024-10-28 ASSESSMENT — PROMIS GLOBAL HEALTH SCALE
CARRYOUT_PHYSICAL_ACTIVITIES: COMPLETELY
RATE_AVERAGE_FATIGUE: MILD
RATE_PHYSICAL_HEALTH: GOOD
RATE_SOCIAL_SATISFACTION: VERY GOOD
RATE_GENERAL_HEALTH: GOOD
RATE_MENTAL_HEALTH: VERY GOOD
RATE_QUALITY_OF_LIFE: EXCELLENT
RATE_AVERAGE_PAIN: 0
EMOTIONAL_PROBLEMS: NEVER
CARRYOUT_SOCIAL_ACTIVITIES: VERY GOOD

## 2024-10-28 ASSESSMENT — PATIENT HEALTH QUESTIONNAIRE - PHQ9
SUM OF ALL RESPONSES TO PHQ9 QUESTIONS 1 AND 2: 0
2. FEELING DOWN, DEPRESSED OR HOPELESS: NOT AT ALL
1. LITTLE INTEREST OR PLEASURE IN DOING THINGS: NOT AT ALL

## 2024-11-11 ENCOUNTER — HOSPITAL ENCOUNTER (OUTPATIENT)
Dept: RADIOLOGY | Facility: CLINIC | Age: 44
Discharge: HOME | End: 2024-11-11
Payer: COMMERCIAL

## 2024-11-11 VITALS — BODY MASS INDEX: 33.18 KG/M2 | HEIGHT: 71 IN | WEIGHT: 237 LBS

## 2024-11-11 DIAGNOSIS — Z12.31 SCREENING MAMMOGRAM FOR BREAST CANCER: ICD-10-CM

## 2024-11-11 PROCEDURE — 77063 BREAST TOMOSYNTHESIS BI: CPT | Performed by: RADIOLOGY

## 2024-11-11 PROCEDURE — 77067 SCR MAMMO BI INCL CAD: CPT | Performed by: RADIOLOGY

## 2024-11-11 PROCEDURE — 77067 SCR MAMMO BI INCL CAD: CPT

## 2024-11-14 ENCOUNTER — APPOINTMENT (OUTPATIENT)
Dept: UROLOGY | Facility: CLINIC | Age: 44
End: 2024-11-14
Payer: COMMERCIAL

## 2024-11-27 RX ORDER — LIDOCAINE HYDROCHLORIDE 20 MG/ML
1 JELLY TOPICAL ONCE
Status: COMPLETED | OUTPATIENT
Start: 2024-12-05 | End: 2024-12-05

## 2024-12-05 ENCOUNTER — APPOINTMENT (OUTPATIENT)
Dept: UROLOGY | Facility: CLINIC | Age: 44
End: 2024-12-05
Payer: COMMERCIAL

## 2024-12-05 VITALS
WEIGHT: 237 LBS | BODY MASS INDEX: 33.05 KG/M2 | HEART RATE: 74 BPM | TEMPERATURE: 97.1 F | DIASTOLIC BLOOD PRESSURE: 90 MMHG | SYSTOLIC BLOOD PRESSURE: 177 MMHG

## 2024-12-05 DIAGNOSIS — R31.0 GROSS HEMATURIA: ICD-10-CM

## 2024-12-05 LAB
POC APPEARANCE, URINE: ABNORMAL
POC BILIRUBIN, URINE: NEGATIVE
POC BLOOD, URINE: NEGATIVE
POC COLOR, URINE: YELLOW
POC GLUCOSE, URINE: NEGATIVE MG/DL
POC KETONES, URINE: NEGATIVE MG/DL
POC LEUKOCYTES, URINE: NEGATIVE
POC NITRITE,URINE: NEGATIVE
POC PH, URINE: 5 PH
POC PROTEIN, URINE: NEGATIVE MG/DL
POC SPECIFIC GRAVITY, URINE: >=1.03
POC UROBILINOGEN, URINE: 0.2 EU/DL

## 2024-12-05 PROCEDURE — 81003 URINALYSIS AUTO W/O SCOPE: CPT | Performed by: UROLOGY

## 2024-12-05 PROCEDURE — 52000 CYSTOURETHROSCOPY: CPT | Performed by: UROLOGY

## 2024-12-05 NOTE — PROGRESS NOTES
HISTORY OF PRESENT ILLNESS:  This is a 43 y.o. female who presents for follow-up for gross hematuria. She is here for her cystoscopy. She recently underwent a CT urogram on [9/6/24]. She was seen by Sania Orr CNP on [8/12/24].     Gross hematuria  Dysuria  Urinary frequency    CT urogram was reviewed. Below is a partial transcription:    FINDINGS:  Minimal bibasilar atelectasis. Small sliding hernia.      No renal stone. No renal mass. No perinephric inflammation.      Delayed imaging shows normal ureters. No filling defects urinary bladder.      Right ovarian cyst measuring 3.5 cm. Is physiologic free fluid the pelvis.      No free air.      Unremarkable adrenal glands pancreas and spleen. Unremarkable liver and biliary system.      No aneurysm.  No suspicious osseous lesions. Slight scoliosis. Small ventral hernia 1 cm ventral hernia containing fat      IMPRESSION:  No renal stone, renal mass, hydronephrosis. No filling defects seen in either ureter or urinary bladder. No correlate for symptoms    Kirby's note reviewed. Below is a partial transcription:    One culture proven infection in April 2024. Endorses years worth of frequent UTI symptoms. She states dehydration and intercourse tend to exacerbate symptoms. One episode of gross hematuria in June 2024 when he was in [place] with severe pain. Endorses primary bladder pain, not urethral pain. Did not have a UTI at that time.      Drinks cranberry juice with water, Dr. Pepper and sprite, as well as water      Feels that she empties bladder well when she urinates.      Hx of uterine fibroids s/p hysterectomy in 2021. Denies s/s of prolapse and bulge symptoms.      Never smoked. No chemical or toxin exposure.      Sexually active with spouse, some pain with intercourse. Feels very dry     Patient confirms not smoking  She reports having no infection correlating with hematuria  Reports having urgency/frequency issues    Drinks about 24 ounces a day    DTFx:  2-3x   NTFx: rare (1x/2-3 months)    She is not menstruating (hysterectomy at 21 years old due to fibroids)         PHYSICAL EXAMINATION:  No LMP recorded (lmp unknown). Patient has had a hysterectomy.  Body mass index is 33.05 kg/m².  Visit Vitals  /90   Pulse 74   Temp 36.2 °C (97.1 °F)   Wt 108 kg (237 lb)   LMP  (LMP Unknown)   BMI 33.05 kg/m²   OB Status Hysterectomy   Smoking Status Never   BSA 2.33 m²     General Appearance: well appearing  Neuro: Alert and oriented     Urine dip:   Recent Results (from the past 6 hours)   POCT UA Automated manually resulted    Collection Time: 12/05/24 12:47 PM   Result Value Ref Range    POC Color, Urine Yellow Straw, Yellow, Light-Yellow    POC Appearance, Urine Cloudy (A) Clear    POC Glucose, Urine NEGATIVE NEGATIVE mg/dl    POC Bilirubin, Urine NEGATIVE NEGATIVE    POC Ketones, Urine NEGATIVE NEGATIVE mg/dl    POC Specific Gravity, Urine >=1.030 1.005 - 1.035    POC Blood, Urine NEGATIVE NEGATIVE    POC PH, Urine 5.0 No Reference Range Established PH    POC Protein, Urine NEGATIVE NEGATIVE, 30 (1+) mg/dl    POC Urobilinogen, Urine 0.2 0.2, 1.0 EU/DL    Poc Nitrite, Urine NEGATIVE NEGATIVE    POC Leukocytes, Urine NEGATIVE NEGATIVE     Patient ID: Vance Gage is a 43 y.o. female.    Cystoscopy    Date/Time: 12/5/2024 1:00 PM    Performed by: Ilya Lopez MD  Authorized by: Ilya Lopez MD    Procedure - Bladder Cystoscopy:     Procedure details: cystoscopy    PROCEDURE NOTE:    OPERATION:  Flexible Cystourethroscopy    SURGEON: Ilya Lopez MD    ANESTHESIA:  2% lidocaine jelly    COMPLICATIONS:  None    SPECIMEN:  Voided urine was not collected and submitted for cytology.    Estimated Blood Loss: Minimal     Complications: None     Patient presented for cystoscopy. They were brought to the procedure room, they were consented, the patient was prepped in normal fashion and placed on the cystobed.     A flexible scope was inserted and the entire urethra  and bladder were examined.? A complete cystoscopy was performed. There were no mucosal lesions noted. The ureteral orifices were in normal location.     There were no remarkable findings revealed during investigation of the bladder.    The patient tolerated the procedure well. There were no complications. Routine post-cystoscopy instructions were given.      IMPRESSION AND PLAN:  Vance Gage is a 43 y.o. who presents with hematuria. She is here today for her cystoscopy.     Hematuria    We found nothing remarkable on the patient's cystoscopy today.     We would like the patient to begin to take D-MANNOSE daily.     We also advised the patient on the urinary benefits of cutting down or cutting out caffeine and carbonated beverage.      Follow up with Sania Orr CNP.     Georgeibe Attestation  By signing my name below, Joleen SAMAYOA Scribe   attest that this documentation has been prepared under the direction and in the presence of Ilya Lopez MD.

## 2024-12-16 ENCOUNTER — APPOINTMENT (OUTPATIENT)
Dept: UROLOGY | Facility: CLINIC | Age: 44
End: 2024-12-16
Payer: COMMERCIAL

## 2024-12-16 VITALS — DIASTOLIC BLOOD PRESSURE: 111 MMHG | HEART RATE: 74 BPM | SYSTOLIC BLOOD PRESSURE: 175 MMHG

## 2024-12-16 DIAGNOSIS — R31.0 GROSS HEMATURIA: ICD-10-CM

## 2024-12-16 DIAGNOSIS — N39.0 RECURRENT UTI: Primary | ICD-10-CM

## 2024-12-16 LAB
POC APPEARANCE, URINE: CLEAR
POC BILIRUBIN, URINE: NEGATIVE
POC BLOOD, URINE: ABNORMAL
POC COLOR, URINE: YELLOW
POC GLUCOSE, URINE: NEGATIVE MG/DL
POC KETONES, URINE: NEGATIVE MG/DL
POC LEUKOCYTES, URINE: NEGATIVE
POC NITRITE,URINE: NEGATIVE
POC PH, URINE: 6 PH
POC PROTEIN, URINE: NEGATIVE MG/DL
POC SPECIFIC GRAVITY, URINE: >=1.03
POC UROBILINOGEN, URINE: 0.2 EU/DL

## 2024-12-16 PROCEDURE — 99213 OFFICE O/P EST LOW 20 MIN: CPT | Performed by: NURSE PRACTITIONER

## 2024-12-16 PROCEDURE — 3077F SYST BP >= 140 MM HG: CPT | Performed by: NURSE PRACTITIONER

## 2024-12-16 PROCEDURE — 81003 URINALYSIS AUTO W/O SCOPE: CPT | Performed by: NURSE PRACTITIONER

## 2024-12-16 PROCEDURE — 3080F DIAST BP >= 90 MM HG: CPT | Performed by: NURSE PRACTITIONER

## 2024-12-16 NOTE — PROGRESS NOTES
Subjective   Patient ID: Vance Gage is a 43 y.o. female who presents for Follow Up After Cysto .  Presents for follow up of frequent dysuria not always in the context of infection. One culture proven infection in April 2024. Endorses years worth of frequent UTI symptoms. She states dehydration and intercourse tend to exacerbate symptoms. One episode of gross hematuria in June 2024 when he was in [place] with severe pain. Endorses primary bladder pain, not urethral pain. Did not have a UTI at that time.      Drinks cranberry juice with water, Dr. Pepper and sprite, as well as water. Notices exacerbation of symptoms with dark pop.      Feels that she empties bladder well when she urinates.      Hx of uterine fibroids s/p hysterectomy in 2021. Denies s/s of prolapse and bulge symptoms.      Never smoked. No chemical or toxin exposure.      Sexually active with spouse, some pain with intercourse. Feels very dry. Did not start estradiol or d-mannose          Review of Systems   All other systems reviewed and are negative.      Objective   Physical Exam  Vitals reviewed.     Alert and oriented x3  Moist mucous membranes  Breathes easily on room air  Abdomen soft, nondistended. Obese  No edema  No scleral icterus  No focal neurological deficits  Appears stated age, no acute distress       Office Visit on 12/16/2024   Component Date Value Ref Range Status    POC Color, Urine 12/16/2024 Yellow  Straw, Yellow, Light-Yellow Final    POC Appearance, Urine 12/16/2024 Clear  Clear Final    POC Glucose, Urine 12/16/2024 NEGATIVE  NEGATIVE mg/dl Final    POC Bilirubin, Urine 12/16/2024 NEGATIVE  NEGATIVE Final    POC Ketones, Urine 12/16/2024 NEGATIVE  NEGATIVE mg/dl Final    POC Specific Gravity, Urine 12/16/2024 >=1.030  1.005 - 1.035 Final    POC Blood, Urine 12/16/2024 TRACE-Intact (A)  NEGATIVE Final    POC PH, Urine 12/16/2024 6.0  No Reference Range Established PH Final    POC Protein, Urine 12/16/2024 NEGATIVE   NEGATIVE, 30 (1+) mg/dl Final    POC Urobilinogen, Urine 12/16/2024 0.2  0.2, 1.0 EU/DL Final    Poc Nitrite, Urine 12/16/2024 NEGATIVE  NEGATIVE Final    POC Leukocytes, Urine 12/16/2024 NEGATIVE  NEGATIVE Final         Assessment/Plan   Diagnoses and all orders for this visit:  Recurrent UTI  Gross hematuria  -     POCT UA Automated manually resulted    Requested she start estradiol and discussed response. She is in agreement with plan. Will also trial d-mannose. Plan for 6 month follow up or sooner if needed.        Sania Orr, VINH-CNP 12/16/24 10:48 AM

## 2024-12-27 ENCOUNTER — LAB (OUTPATIENT)
Dept: LAB | Facility: LAB | Age: 44
End: 2024-12-27
Payer: COMMERCIAL

## 2024-12-27 DIAGNOSIS — Z00.00 WELL ADULT HEALTH CHECK: ICD-10-CM

## 2024-12-27 DIAGNOSIS — E55.9 VITAMIN D DEFICIENCY: ICD-10-CM

## 2024-12-27 LAB
25(OH)D3 SERPL-MCNC: 31 NG/ML (ref 30–100)
ALBUMIN SERPL BCP-MCNC: 4.1 G/DL (ref 3.4–5)
ALP SERPL-CCNC: 54 U/L (ref 33–110)
ALT SERPL W P-5'-P-CCNC: 19 U/L (ref 7–45)
ANION GAP SERPL CALC-SCNC: 10 MMOL/L (ref 10–20)
AST SERPL W P-5'-P-CCNC: 17 U/L (ref 9–39)
BILIRUB SERPL-MCNC: 0.5 MG/DL (ref 0–1.2)
BUN SERPL-MCNC: 10 MG/DL (ref 6–23)
CALCIUM SERPL-MCNC: 9.1 MG/DL (ref 8.6–10.3)
CHLORIDE SERPL-SCNC: 105 MMOL/L (ref 98–107)
CHOLEST SERPL-MCNC: 149 MG/DL (ref 0–199)
CHOLESTEROL/HDL RATIO: 3.1
CO2 SERPL-SCNC: 27 MMOL/L (ref 21–32)
CREAT SERPL-MCNC: 1.04 MG/DL (ref 0.5–1.05)
EGFRCR SERPLBLD CKD-EPI 2021: 68 ML/MIN/1.73M*2
GLUCOSE SERPL-MCNC: 83 MG/DL (ref 74–99)
HDLC SERPL-MCNC: 48.5 MG/DL
LDLC SERPL CALC-MCNC: 91 MG/DL
NON HDL CHOLESTEROL: 101 MG/DL (ref 0–149)
POTASSIUM SERPL-SCNC: 4.4 MMOL/L (ref 3.5–5.3)
PROT SERPL-MCNC: 7.1 G/DL (ref 6.4–8.2)
SODIUM SERPL-SCNC: 138 MMOL/L (ref 136–145)
TRIGL SERPL-MCNC: 50 MG/DL (ref 0–149)
VLDL: 10 MG/DL (ref 0–40)

## 2024-12-27 PROCEDURE — 82306 VITAMIN D 25 HYDROXY: CPT

## 2024-12-27 PROCEDURE — 80061 LIPID PANEL: CPT

## 2024-12-27 PROCEDURE — 80053 COMPREHEN METABOLIC PANEL: CPT

## 2024-12-28 ASSESSMENT — ENCOUNTER SYMPTOMS
PALPITATIONS: 0
SHORTNESS OF BREATH: 0
NECK PAIN: 0
BLURRED VISION: 0
HEADACHES: 0
SWEATS: 1
PND: 0
HYPERTENSION: 1
ORTHOPNEA: 0

## 2024-12-30 ENCOUNTER — APPOINTMENT (OUTPATIENT)
Dept: PRIMARY CARE | Facility: CLINIC | Age: 44
End: 2024-12-30
Payer: COMMERCIAL

## 2024-12-30 VITALS
SYSTOLIC BLOOD PRESSURE: 118 MMHG | TEMPERATURE: 98.1 F | DIASTOLIC BLOOD PRESSURE: 68 MMHG | BODY MASS INDEX: 33.66 KG/M2 | WEIGHT: 241.38 LBS | HEART RATE: 64 BPM

## 2024-12-30 DIAGNOSIS — I10 PRIMARY HYPERTENSION: ICD-10-CM

## 2024-12-30 PROCEDURE — 99213 OFFICE O/P EST LOW 20 MIN: CPT | Performed by: FAMILY MEDICINE

## 2024-12-30 PROCEDURE — 3074F SYST BP LT 130 MM HG: CPT | Performed by: FAMILY MEDICINE

## 2024-12-30 PROCEDURE — 1036F TOBACCO NON-USER: CPT | Performed by: FAMILY MEDICINE

## 2024-12-30 PROCEDURE — 3078F DIAST BP <80 MM HG: CPT | Performed by: FAMILY MEDICINE

## 2024-12-30 RX ORDER — HYDROCHLOROTHIAZIDE 12.5 MG/1
12.5 TABLET ORAL DAILY
Qty: 30 TABLET | Refills: 6 | Status: SHIPPED | OUTPATIENT
Start: 2024-12-30 | End: 2025-12-30

## 2024-12-30 ASSESSMENT — ENCOUNTER SYMPTOMS
SHORTNESS OF BREATH: 0
ORTHOPNEA: 0
HYPERTENSION: 1
BLURRED VISION: 0
NECK PAIN: 0
HEADACHES: 0
PALPITATIONS: 0
SWEATS: 1
PND: 0

## 2024-12-30 NOTE — PROGRESS NOTES
Subjective   Vance Gage is a 44 y.o. female who presents for Hypertension (Follow up - med increased last visit/Doing well on it).    Here for a follow up for her blood pressure.  At home BP is 130/90 kyle.      Hypertension  This is a chronic problem. The current episode started more than 1 month ago. The problem has been gradually worsening since onset. The problem is resistant. Associated symptoms include sweats. Pertinent negatives include no anxiety, blurred vision, chest pain, headaches, malaise/fatigue, neck pain, orthopnea, palpitations, peripheral edema, PND or shortness of breath. There are no associated agents to hypertension. Risk factors for coronary artery disease include obesity. Compliance problems include diet and exercise.         Review of Systems   Constitutional:  Negative for malaise/fatigue.   Eyes:  Negative for blurred vision.   Respiratory:  Negative for shortness of breath.    Cardiovascular:  Negative for chest pain, palpitations, orthopnea and PND.   Musculoskeletal:  Negative for neck pain.   Neurological:  Negative for headaches.       Objective   /68   Pulse 64   Temp 36.7 °C (98.1 °F)   Wt 109 kg (241 lb 6 oz)   LMP  (LMP Unknown)   BMI 33.66 kg/m²     Physical Exam  HENT:      Head: Normocephalic and atraumatic.      Mouth/Throat:      Mouth: Mucous membranes are moist.      Pharynx: Oropharynx is clear.   Eyes:      Extraocular Movements: Extraocular movements intact.      Pupils: Pupils are equal, round, and reactive to light.   Cardiovascular:      Rate and Rhythm: Normal rate and regular rhythm.      Heart sounds: Normal heart sounds.   Pulmonary:      Effort: Pulmonary effort is normal.      Breath sounds: Normal breath sounds.   Musculoskeletal:         General: Normal range of motion.      Cervical back: Normal range of motion.   Lymphadenopathy:      Cervical: No cervical adenopathy.   Skin:     General: Skin is warm and dry.   Neurological:      General: No  focal deficit present.      Mental Status: She is alert.   Psychiatric:         Mood and Affect: Mood normal.         Assessment/Plan   Assessment & Plan  Primary hypertension  Will add hydrochlorothiazide to improve control.    Monitor your blood pressure at home at least once a week and record.  Please bring results to your next visit.  Take your medicine as prescribed.    Exercise at least 30 minutes daily.  Lose weight.   Avoid eating processed foods, canned foods, etc.    Limit adding salt at the table.      Orders:    Follow Up In [facility] - PCP - Established    hydroCHLOROthiazide (Microzide) 12.5 mg tablet; Take 1 tablet (12.5 mg) by mouth once daily.           There are no Patient Instructions on file for this visit.

## 2024-12-30 NOTE — ASSESSMENT & PLAN NOTE
Will add hydrochlorothiazide to improve control.    Monitor your blood pressure at home at least once a week and record.  Please bring results to your next visit.  Take your medicine as prescribed.    Exercise at least 30 minutes daily.  Lose weight.   Avoid eating processed foods, canned foods, etc.    Limit adding salt at the table.      Orders:    Follow Up In [facility] - PCP - Established    hydroCHLOROthiazide (Microzide) 12.5 mg tablet; Take 1 tablet (12.5 mg) by mouth once daily.

## 2025-02-04 DIAGNOSIS — I10 PRIMARY HYPERTENSION: ICD-10-CM

## 2025-02-04 RX ORDER — LOSARTAN POTASSIUM 100 MG/1
100 TABLET ORAL DAILY
Qty: 90 TABLET | Refills: 3 | Status: SHIPPED | OUTPATIENT
Start: 2025-02-04

## 2025-02-04 NOTE — TELEPHONE ENCOUNTER
[facility]    [place] [address] Clinical Support Staff  Phone Number: [phone]     Hi Dr. Phillips:  Can you send in a refill request for my BP medicine? Thanks so much! Have a great night!    Vance

## 2025-06-11 DIAGNOSIS — I10 PRIMARY HYPERTENSION: ICD-10-CM

## 2025-06-11 NOTE — TELEPHONE ENCOUNTER
Danielle msg for refill    hydroCHLOROthiazide 12.5 mg tablet  (Newest Message First)             Vance Gage to   [place] [address] Clinical Support Staff (supporting Bibi Phillips MD) (Selected Message)        6/10/25  5:31 PM  Hi there!     I need a refill of my water pill. Can you call it in to my pharmacy? [facility] on [address] [place].     Thanks so much!     Vance Gage

## 2025-06-12 RX ORDER — HYDROCHLOROTHIAZIDE 12.5 MG/1
12.5 TABLET ORAL DAILY
Qty: 30 TABLET | Refills: 6 | Status: SHIPPED | OUTPATIENT
Start: 2025-06-12 | End: 2026-06-12

## 2025-06-23 ENCOUNTER — APPOINTMENT (OUTPATIENT)
Dept: UROLOGY | Facility: CLINIC | Age: 45
End: 2025-06-23
Payer: COMMERCIAL

## 2025-06-30 ENCOUNTER — APPOINTMENT (OUTPATIENT)
Dept: PRIMARY CARE | Facility: CLINIC | Age: 45
End: 2025-06-30
Payer: COMMERCIAL

## 2025-06-30 VITALS
TEMPERATURE: 97.9 F | HEIGHT: 71 IN | SYSTOLIC BLOOD PRESSURE: 124 MMHG | BODY MASS INDEX: 33.07 KG/M2 | HEART RATE: 71 BPM | WEIGHT: 236.25 LBS | DIASTOLIC BLOOD PRESSURE: 72 MMHG

## 2025-06-30 DIAGNOSIS — I10 PRIMARY HYPERTENSION: Primary | ICD-10-CM

## 2025-06-30 DIAGNOSIS — K21.9 GASTROESOPHAGEAL REFLUX DISEASE WITHOUT ESOPHAGITIS: ICD-10-CM

## 2025-06-30 DIAGNOSIS — H61.23 BILATERAL IMPACTED CERUMEN: ICD-10-CM

## 2025-06-30 DIAGNOSIS — Z00.00 WELL ADULT HEALTH CHECK: ICD-10-CM

## 2025-06-30 DIAGNOSIS — H53.9 VISUAL CHANGES: ICD-10-CM

## 2025-06-30 PROCEDURE — 3078F DIAST BP <80 MM HG: CPT | Performed by: FAMILY MEDICINE

## 2025-06-30 PROCEDURE — 69210 REMOVE IMPACTED EAR WAX UNI: CPT | Performed by: FAMILY MEDICINE

## 2025-06-30 PROCEDURE — 1036F TOBACCO NON-USER: CPT | Performed by: FAMILY MEDICINE

## 2025-06-30 PROCEDURE — 99214 OFFICE O/P EST MOD 30 MIN: CPT | Performed by: FAMILY MEDICINE

## 2025-06-30 PROCEDURE — 3074F SYST BP LT 130 MM HG: CPT | Performed by: FAMILY MEDICINE

## 2025-06-30 PROCEDURE — 3008F BODY MASS INDEX DOCD: CPT | Performed by: FAMILY MEDICINE

## 2025-06-30 RX ORDER — OMEPRAZOLE 20 MG/1
20 CAPSULE, DELAYED RELEASE ORAL DAILY
Qty: 30 CAPSULE | Refills: 0 | Status: SHIPPED | OUTPATIENT
Start: 2025-06-30 | End: 2025-07-30

## 2025-06-30 NOTE — ASSESSMENT & PLAN NOTE
Her symptoms sound like reflux we will trial a PPI for 1 month and see how she does.  If symptoms persist please contact the office  Orders:    omeprazole (PriLOSEC) 20 mg DR capsule; Take 1 capsule (20 mg) by mouth once daily. Do not crush or chew.

## 2025-06-30 NOTE — ASSESSMENT & PLAN NOTE
Good control continue losartan and hydrochlorothiazide.  We talked about diet and exercise also watching salt in the diet        1045

## 2025-06-30 NOTE — PROGRESS NOTES
"Subjective   Vance Gage is a 44 y.o. female who presents for Follow-up (6mon follow up/Was started on new med - hydrochlorothiazide - doing well on med).    Recently noticed.  Cough and burping and sneezing and drainage.    Occasional burning sensation in her chest.  About 2 weeks of symptoms.    Decided to do smaller meals.  Stopped drinking matcha.  Less fried foods.  Less burping.      Sometimes when she's driving she gets dizzy and feels like the lights are strobing.    No eye exam in years.  Her vision has always been good.  Doesn't happen every time.  Symptoms for months.               Review of Systems    Objective   /72   Pulse 71   Temp 36.6 °C (97.9 °F) (Temporal)   Ht 1.803 m (5' 11\")   Wt 107 kg (236 lb 4 oz)   LMP  (LMP Unknown)   BMI 32.95 kg/m²     Physical Exam  HENT:      Head: Normocephalic and atraumatic.      Right Ear: There is impacted cerumen.      Left Ear: There is impacted cerumen.      Nose: Nose normal.      Mouth/Throat:      Mouth: Mucous membranes are moist.      Pharynx: Oropharynx is clear.   Eyes:      Extraocular Movements: Extraocular movements intact.      Pupils: Pupils are equal, round, and reactive to light.   Cardiovascular:      Rate and Rhythm: Normal rate and regular rhythm.      Heart sounds: Normal heart sounds.   Pulmonary:      Effort: Pulmonary effort is normal.      Breath sounds: Normal breath sounds.   Abdominal:      General: Abdomen is flat. Bowel sounds are normal. There is no distension.      Palpations: Abdomen is soft.      Tenderness: There is no abdominal tenderness.   Musculoskeletal:         General: Normal range of motion.      Cervical back: Normal range of motion.   Lymphadenopathy:      Cervical: No cervical adenopathy.   Skin:     General: Skin is warm and dry.   Neurological:      General: No focal deficit present.      Mental Status: She is alert.   Psychiatric:         Mood and Affect: Mood normal.         Assessment/Plan "   Assessment & Plan  Primary hypertension  Good control continue losartan and hydrochlorothiazide.  We talked about diet and exercise also watching salt in the diet       Bilateral impacted cerumen  Her ears were irrigated and she had improvement in symptoms       Gastroesophageal reflux disease without esophagitis  Her symptoms sound like reflux we will trial a PPI for 1 month and see how she does.  If symptoms persist please contact the office  Orders:    omeprazole (PriLOSEC) 20 mg DR capsule; Take 1 capsule (20 mg) by mouth once daily. Do not crush or chew.    Visual changes  She describes a movement sensation when traveling at speed in a vehicle.  She denies double vision but says that the lights seem to strobe.  She has not had her eyes checked in a long time and does not complain of vision problems but I explained that at her age she needs to see an eye doctor every year or 2 anyhow and make sure that there are no eye issues that are contributing to this issue.  Please contact me if your symptoms persist or increase       Well adult health check    Orders:    Follow Up In [facility] - PCP - Health Maintenance; Future           There are no Patient Instructions on file for this visit.

## 2025-08-07 DIAGNOSIS — E55.9 VITAMIN D DEFICIENCY: Primary | ICD-10-CM

## 2025-08-07 DIAGNOSIS — R42 LIGHTHEADEDNESS: ICD-10-CM

## 2025-08-08 ENCOUNTER — APPOINTMENT (OUTPATIENT)
Dept: UROLOGY | Facility: CLINIC | Age: 45
End: 2025-08-08
Payer: COMMERCIAL

## 2025-08-08 VITALS — SYSTOLIC BLOOD PRESSURE: 132 MMHG | DIASTOLIC BLOOD PRESSURE: 87 MMHG | HEART RATE: 87 BPM | TEMPERATURE: 97.7 F

## 2025-08-08 DIAGNOSIS — R35.0 URINARY FREQUENCY: ICD-10-CM

## 2025-08-08 DIAGNOSIS — R31.0 GROSS HEMATURIA: ICD-10-CM

## 2025-08-08 DIAGNOSIS — R30.0 DYSURIA: ICD-10-CM

## 2025-08-08 DIAGNOSIS — N39.0 RECURRENT UTI: Primary | ICD-10-CM

## 2025-08-08 LAB
POC APPEARANCE, URINE: CLEAR
POC BILIRUBIN, URINE: NEGATIVE
POC BLOOD, URINE: NEGATIVE
POC COLOR, URINE: YELLOW
POC GLUCOSE, URINE: NEGATIVE MG/DL
POC KETONES, URINE: NEGATIVE MG/DL
POC LEUKOCYTES, URINE: NEGATIVE
POC NITRITE,URINE: NEGATIVE
POC PH, URINE: 6.5 PH
POC PROTEIN, URINE: NEGATIVE MG/DL
POC SPECIFIC GRAVITY, URINE: 1.02
POC UROBILINOGEN, URINE: 0.2 EU/DL

## 2025-08-08 PROCEDURE — 81003 URINALYSIS AUTO W/O SCOPE: CPT | Performed by: NURSE PRACTITIONER

## 2025-08-08 PROCEDURE — 3079F DIAST BP 80-89 MM HG: CPT | Performed by: NURSE PRACTITIONER

## 2025-08-08 PROCEDURE — 3075F SYST BP GE 130 - 139MM HG: CPT | Performed by: NURSE PRACTITIONER

## 2025-08-08 PROCEDURE — 99213 OFFICE O/P EST LOW 20 MIN: CPT | Performed by: NURSE PRACTITIONER

## 2025-08-08 PROCEDURE — 1036F TOBACCO NON-USER: CPT | Performed by: NURSE PRACTITIONER

## 2025-08-08 RX ORDER — ESTRADIOL 0.1 MG/G
CREAM VAGINAL
Qty: 42.5 G | Refills: 5 | Status: SHIPPED | OUTPATIENT
Start: 2025-08-08 | End: 2026-08-08

## 2025-08-08 NOTE — PROGRESS NOTES
Subjective   Patient ID: Vance Gage is a 44 y.o. female who presents for Follow-up.  Presents for follow up of frequent dysuria not always in the context of infection. One culture proven infection in April 2024. Endorses years worth of frequent UTI symptoms. She states dehydration and intercourse tend to exacerbate symptoms. One episode of gross hematuria in June 2024 when he was in [place] with severe pain. Endorses primary bladder pain, not urethral pain. Did not have a UTI at that time. Feels that she empties bladder well when she urinates.      Drinks cranberry juice with water, Dr. Pepper and sprite, as well as water. Notices exacerbation of symptoms with dark pop.      Hx of uterine fibroids s/p hysterectomy in 2021. Denies s/s of prolapse and bulge symptoms.      Never smoked. No chemical or toxin exposure.      Sexually active with spouse, some pain with intercourse. Feels very dry. Using vaginal estradiol, minimal improvement with this.              Review of Systems   All other systems reviewed and are negative.      Objective   Physical Exam  Vitals reviewed.     Alert and oriented x3  Moist mucous membranes  Breathes easily on room air  Abdomen soft, nondistended. Obese  No edema  No scleral icterus  No focal neurological deficits  Appears stated age, no acute distress    Office Visit on 08/08/2025   Component Date Value Ref Range Status    POC Color, Urine 08/08/2025 Yellow  Straw, Yellow, Light-Yellow Final    POC Appearance, Urine 08/08/2025 Clear  Clear Final    POC Glucose, Urine 08/08/2025 NEGATIVE  NEGATIVE mg/dl Final    POC Bilirubin, Urine 08/08/2025 NEGATIVE  NEGATIVE Final    POC Ketones, Urine 08/08/2025 NEGATIVE  NEGATIVE mg/dl Final    POC Specific Gravity, Urine 08/08/2025 1.020  1.005 - 1.035 Final    POC Blood, Urine 08/08/2025 NEGATIVE  NEGATIVE Final    POC PH, Urine 08/08/2025 6.5  No Reference Range Established PH Final    POC Protein, Urine 08/08/2025 NEGATIVE  NEGATIVE mg/dl  Final    POC Urobilinogen, Urine 08/08/2025 0.2  0.2, 1.0 EU/DL Final    Poc Nitrite, Urine 08/08/2025 NEGATIVE  NEGATIVE Final    POC Leukocytes, Urine 08/08/2025 NEGATIVE  NEGATIVE Final       Assessment/Plan   Diagnoses and all orders for this visit:  Recurrent UTI  -     POCT UA Automated manually resulted  Urinary frequency  -     estradiol (Estrace) 0.01 % (0.1 mg/gram) vaginal cream; Apply pea sized amount into vagina nightly for 2 weeks, then 3 times per week.  Gross hematuria  -     estradiol (Estrace) 0.01 % (0.1 mg/gram) vaginal cream; Apply pea sized amount into vagina nightly for 2 weeks, then 3 times per week.  Dysuria  -     estradiol (Estrace) 0.01 % (0.1 mg/gram) vaginal cream; Apply pea sized amount into vagina nightly for 2 weeks, then 3 times per week.    Doing well with estradiol. Not bothered by infections at present. Denies difficulties with sexual functioning. Plan to continue at present.        Sania Orr, VINH-CNP 08/08/25 3:17 PM

## 2025-08-09 LAB
25(OH)D3+25(OH)D2 SERPL-MCNC: 43 NG/ML (ref 30–100)
ALBUMIN SERPL-MCNC: 4.1 G/DL (ref 3.6–5.1)
ALP SERPL-CCNC: 67 U/L (ref 31–125)
ALT SERPL-CCNC: 15 U/L (ref 6–29)
ANION GAP SERPL CALCULATED.4IONS-SCNC: 6 MMOL/L (CALC) (ref 7–17)
AST SERPL-CCNC: 13 U/L (ref 10–30)
BILIRUB SERPL-MCNC: 0.3 MG/DL (ref 0.2–1.2)
BUN SERPL-MCNC: 11 MG/DL (ref 7–25)
CALCIUM SERPL-MCNC: 9.3 MG/DL (ref 8.6–10.2)
CHLORIDE SERPL-SCNC: 101 MMOL/L (ref 98–110)
CO2 SERPL-SCNC: 30 MMOL/L (ref 20–32)
CREAT SERPL-MCNC: 0.99 MG/DL (ref 0.5–0.99)
EGFRCR SERPLBLD CKD-EPI 2021: 72 ML/MIN/1.73M2
ERYTHROCYTE [DISTWIDTH] IN BLOOD BY AUTOMATED COUNT: 13 % (ref 11–15)
GLUCOSE SERPL-MCNC: 96 MG/DL (ref 65–99)
HCT VFR BLD AUTO: 41 % (ref 35–45)
HGB BLD-MCNC: 13 G/DL (ref 11.7–15.5)
MCH RBC QN AUTO: 27 PG (ref 27–33)
MCHC RBC AUTO-ENTMCNC: 31.7 G/DL (ref 32–36)
MCV RBC AUTO: 85.1 FL (ref 80–100)
PLATELET # BLD AUTO: 233 THOUSAND/UL (ref 140–400)
PMV BLD REES-ECKER: 9.5 FL (ref 7.5–12.5)
POTASSIUM SERPL-SCNC: 4 MMOL/L (ref 3.5–5.3)
PROT SERPL-MCNC: 7 G/DL (ref 6.1–8.1)
RBC # BLD AUTO: 4.82 MILLION/UL (ref 3.8–5.1)
SODIUM SERPL-SCNC: 137 MMOL/L (ref 135–146)
TSH SERPL-ACNC: 0.57 MIU/L
VIT B12 SERPL-MCNC: 348 PG/ML (ref 200–1100)
WBC # BLD AUTO: 5.9 THOUSAND/UL (ref 3.8–10.8)

## 2025-08-12 ENCOUNTER — APPOINTMENT (OUTPATIENT)
Dept: PRIMARY CARE | Facility: CLINIC | Age: 45
End: 2025-08-12
Payer: COMMERCIAL

## 2025-08-12 VITALS
TEMPERATURE: 97.7 F | BODY MASS INDEX: 33.88 KG/M2 | HEART RATE: 84 BPM | HEIGHT: 71 IN | RESPIRATION RATE: 14 BRPM | WEIGHT: 242 LBS | OXYGEN SATURATION: 95 % | DIASTOLIC BLOOD PRESSURE: 72 MMHG | SYSTOLIC BLOOD PRESSURE: 134 MMHG

## 2025-08-12 DIAGNOSIS — R42 VERTIGO: Primary | ICD-10-CM

## 2025-08-12 DIAGNOSIS — R41.89 COGNITIVE CHANGE: ICD-10-CM

## 2025-08-12 DIAGNOSIS — R42 LIGHTHEADEDNESS: ICD-10-CM

## 2025-08-12 DIAGNOSIS — R00.2 INTERMITTENT PALPITATIONS: ICD-10-CM

## 2025-08-12 PROBLEM — E55.9 VITAMIN D DEFICIENCY: Status: RESOLVED | Noted: 2023-01-26 | Resolved: 2025-08-12

## 2025-08-12 PROBLEM — E53.8 VITAMIN B12 DEFICIENCY: Status: RESOLVED | Noted: 2023-01-26 | Resolved: 2025-08-12

## 2025-08-12 PROBLEM — H61.23 BILATERAL IMPACTED CERUMEN: Status: RESOLVED | Noted: 2025-06-30 | Resolved: 2025-08-12

## 2025-08-12 PROCEDURE — 99214 OFFICE O/P EST MOD 30 MIN: CPT | Performed by: FAMILY MEDICINE

## 2025-08-12 PROCEDURE — 3078F DIAST BP <80 MM HG: CPT | Performed by: FAMILY MEDICINE

## 2025-08-12 PROCEDURE — 3008F BODY MASS INDEX DOCD: CPT | Performed by: FAMILY MEDICINE

## 2025-08-12 PROCEDURE — 3075F SYST BP GE 130 - 139MM HG: CPT | Performed by: FAMILY MEDICINE

## 2025-08-12 RX ORDER — MECLIZINE HYDROCHLORIDE 25 MG/1
25 TABLET ORAL 3 TIMES DAILY PRN
Qty: 60 TABLET | Refills: 1 | Status: SHIPPED | OUTPATIENT
Start: 2025-08-12 | End: 2026-08-12

## 2025-08-12 ASSESSMENT — PATIENT HEALTH QUESTIONNAIRE - PHQ9
SUM OF ALL RESPONSES TO PHQ9 QUESTIONS 1 AND 2: 0
1. LITTLE INTEREST OR PLEASURE IN DOING THINGS: NOT AT ALL
2. FEELING DOWN, DEPRESSED OR HOPELESS: NOT AT ALL

## 2025-08-28 ENCOUNTER — HOSPITAL ENCOUNTER (OUTPATIENT)
Dept: RADIOLOGY | Facility: HOSPITAL | Age: 45
Discharge: HOME | End: 2025-08-28
Payer: COMMERCIAL

## 2025-08-28 DIAGNOSIS — R41.89 COGNITIVE CHANGE: ICD-10-CM

## 2025-08-28 PROCEDURE — A9575 INJ GADOTERATE MEGLUMI 0.1ML: HCPCS | Performed by: FAMILY MEDICINE

## 2025-08-28 PROCEDURE — 70553 MRI BRAIN STEM W/O & W/DYE: CPT | Performed by: RADIOLOGY

## 2025-08-28 PROCEDURE — 70553 MRI BRAIN STEM W/O & W/DYE: CPT

## 2025-08-28 PROCEDURE — 2550000001 HC RX 255 CONTRASTS: Performed by: FAMILY MEDICINE

## 2025-08-28 RX ORDER — GADOTERATE MEGLUMINE 376.9 MG/ML
0.2 INJECTION INTRAVENOUS
Status: COMPLETED | OUTPATIENT
Start: 2025-08-28 | End: 2025-08-28

## 2025-08-28 RX ADMIN — GADOTERATE MEGLUMINE 20 ML: 376.9 INJECTION INTRAVENOUS at 11:27

## 2025-09-23 ENCOUNTER — APPOINTMENT (OUTPATIENT)
Dept: PRIMARY CARE | Facility: CLINIC | Age: 45
End: 2025-09-23
Payer: COMMERCIAL

## 2025-10-31 ENCOUNTER — APPOINTMENT (OUTPATIENT)
Dept: PRIMARY CARE | Facility: CLINIC | Age: 45
End: 2025-10-31
Payer: COMMERCIAL

## 2026-08-10 ENCOUNTER — APPOINTMENT (OUTPATIENT)
Dept: UROLOGY | Facility: CLINIC | Age: 46
End: 2026-08-10
Payer: COMMERCIAL